# Patient Record
Sex: FEMALE | Race: BLACK OR AFRICAN AMERICAN | NOT HISPANIC OR LATINO | Employment: OTHER | URBAN - METROPOLITAN AREA
[De-identification: names, ages, dates, MRNs, and addresses within clinical notes are randomized per-mention and may not be internally consistent; named-entity substitution may affect disease eponyms.]

---

## 2017-02-13 ENCOUNTER — GENERIC CONVERSION - ENCOUNTER (OUTPATIENT)
Dept: OTHER | Facility: OTHER | Age: 37
End: 2017-02-13

## 2017-03-13 ENCOUNTER — HOSPITAL ENCOUNTER (EMERGENCY)
Facility: HOSPITAL | Age: 37
Discharge: HOME | End: 2017-03-13
Attending: EMERGENCY MEDICINE | Admitting: EMERGENCY MEDICINE
Payer: COMMERCIAL

## 2017-03-13 VITALS
OXYGEN SATURATION: 100 % | HEART RATE: 99 BPM | RESPIRATION RATE: 18 BRPM | SYSTOLIC BLOOD PRESSURE: 132 MMHG | DIASTOLIC BLOOD PRESSURE: 70 MMHG | TEMPERATURE: 98.6 F

## 2017-03-13 DIAGNOSIS — K08.89 TOOTHACHE: Primary | ICD-10-CM

## 2017-03-13 PROCEDURE — 99282 EMERGENCY DEPT VISIT SF MDM: CPT

## 2017-03-13 RX ORDER — HYDROCODONE BITARTRATE AND ACETAMINOPHEN 5; 325 MG/1; MG/1
1 TABLET ORAL EVERY 6 HOURS PRN
Qty: 6 TABLET | Refills: 0 | Status: SHIPPED | OUTPATIENT
Start: 2017-03-13 | End: 2017-03-17

## 2017-03-13 RX ORDER — PENICILLIN V POTASSIUM 500 MG/1
500 TABLET ORAL 4 TIMES DAILY
Qty: 40 TABLET | Refills: 0 | Status: SHIPPED | OUTPATIENT
Start: 2017-03-13 | End: 2017-03-23

## 2017-10-27 ENCOUNTER — HOSPITAL ENCOUNTER (OUTPATIENT)
Dept: RADIOLOGY | Age: 37
Discharge: HOME | End: 2017-10-27
Payer: COMMERCIAL

## 2017-10-27 DIAGNOSIS — C73 MALIGNANT NEOPLASM OF THYROID GLAND (HCC): ICD-10-CM

## 2018-03-07 ENCOUNTER — HOSPITAL ENCOUNTER (OUTPATIENT)
Dept: RADIOLOGY | Age: 38
Discharge: HOME | End: 2018-03-07
Payer: COMMERCIAL

## 2018-03-07 PROCEDURE — 96372 THER/PROPH/DIAG INJ SC/IM: CPT

## 2018-03-07 RX ADMIN — THYROTROPIN ALFA 0.9 MG: 0.9 INJECTION, POWDER, FOR SOLUTION INTRAMUSCULAR at 09:15

## 2018-03-08 ENCOUNTER — HOSPITAL ENCOUNTER (OUTPATIENT)
Dept: RADIOLOGY | Age: 38
Discharge: HOME | End: 2018-03-08
Payer: COMMERCIAL

## 2018-03-08 DIAGNOSIS — C73 MALIGNANT NEOPLASM OF THYROID GLAND (HCC): ICD-10-CM

## 2018-03-08 PROCEDURE — 96372 THER/PROPH/DIAG INJ SC/IM: CPT

## 2018-03-08 RX ADMIN — THYROTROPIN ALFA 0.9 MG: 0.9 INJECTION, POWDER, FOR SOLUTION INTRAMUSCULAR at 09:05

## 2018-03-09 ENCOUNTER — TRANSCRIBE ORDERS (OUTPATIENT)
Dept: ADMINISTRATIVE | Age: 38
End: 2018-03-09

## 2018-03-09 ENCOUNTER — HOSPITAL ENCOUNTER (OUTPATIENT)
Dept: RADIOLOGY | Age: 38
Discharge: HOME | End: 2018-03-09
Payer: COMMERCIAL

## 2018-03-09 ENCOUNTER — HOSPITAL ENCOUNTER (OUTPATIENT)
Dept: RADIOLOGY | Facility: HOSPITAL | Age: 38
Discharge: HOME | End: 2018-03-09
Payer: COMMERCIAL

## 2018-03-09 ENCOUNTER — APPOINTMENT (OUTPATIENT)
Dept: LAB | Age: 38
End: 2018-03-09
Payer: COMMERCIAL

## 2018-03-09 DIAGNOSIS — C73 MALIGNANT NEOPLASM OF THYROID GLAND (HCC): Primary | ICD-10-CM

## 2018-03-09 DIAGNOSIS — C73 MALIGNANT NEOPLASM OF THYROID GLAND (HCC): ICD-10-CM

## 2018-03-09 LAB — TSH SERPL DL<=0.05 MIU/L-ACNC: 74.2 UIU/ML (ref 0.36–3.74)

## 2018-03-09 PROCEDURE — A9509 IODINE I-123 SOD IODIDE MIL: HCPCS

## 2018-03-09 PROCEDURE — 36415 COLL VENOUS BLD VENIPUNCTURE: CPT

## 2018-03-09 PROCEDURE — 79005 NUCLEAR RX ORAL ADMIN: CPT

## 2018-03-09 PROCEDURE — 84432 ASSAY OF THYROGLOBULIN: CPT

## 2018-03-09 PROCEDURE — A9517 I131 IODIDE CAP, RX: HCPCS

## 2018-03-09 PROCEDURE — 84443 ASSAY THYROID STIM HORMONE: CPT

## 2018-03-09 PROCEDURE — 86800 THYROGLOBULIN ANTIBODY: CPT

## 2018-03-09 PROCEDURE — 78018 THYROID MET IMAGING BODY: CPT

## 2018-03-10 LAB — THYROGLOB AB SERPL-ACNC: 26.4 IU/ML (ref 0–0.9)

## 2018-03-14 LAB
THYROGLOB AB SERPL-ACNC: 24.3 IU/ML (ref 0–0.9)
THYROGLOB SERPL-MCNC: 2.5 NG/ML

## 2018-03-15 ENCOUNTER — HOSPITAL ENCOUNTER (OUTPATIENT)
Dept: RADIOLOGY | Age: 38
Discharge: HOME | End: 2018-03-15
Payer: COMMERCIAL

## 2018-03-15 DIAGNOSIS — C73 MALIGNANT NEOPLASM OF THYROID GLAND (HCC): ICD-10-CM

## 2018-03-15 PROCEDURE — 78018 THYROID MET IMAGING BODY: CPT

## 2018-03-16 ENCOUNTER — TRANSCRIBE ORDERS (OUTPATIENT)
Dept: ADMINISTRATIVE | Facility: HOSPITAL | Age: 38
End: 2018-03-16

## 2018-03-16 DIAGNOSIS — C73 MALIGNANT NEOPLASM OF THYROID GLAND (HCC): Primary | ICD-10-CM

## 2018-03-22 ENCOUNTER — HOSPITAL ENCOUNTER (OUTPATIENT)
Dept: ULTRASOUND IMAGING | Facility: HOSPITAL | Age: 38
Discharge: HOME | End: 2018-03-22
Payer: COMMERCIAL

## 2018-03-22 DIAGNOSIS — C73 MALIGNANT NEOPLASM OF THYROID GLAND (HCC): ICD-10-CM

## 2018-03-22 PROCEDURE — 76536 US EXAM OF HEAD AND NECK: CPT

## 2018-04-19 ENCOUNTER — HOSPITAL ENCOUNTER (EMERGENCY)
Facility: HOSPITAL | Age: 38
Discharge: HOME | End: 2018-04-19
Attending: EMERGENCY MEDICINE | Admitting: EMERGENCY MEDICINE
Payer: COMMERCIAL

## 2018-04-19 VITALS
WEIGHT: 163 LBS | HEIGHT: 63 IN | TEMPERATURE: 98.5 F | DIASTOLIC BLOOD PRESSURE: 97 MMHG | HEART RATE: 106 BPM | BODY MASS INDEX: 28.88 KG/M2 | RESPIRATION RATE: 18 BRPM | OXYGEN SATURATION: 99 % | SYSTOLIC BLOOD PRESSURE: 160 MMHG

## 2018-04-19 DIAGNOSIS — R51.9 HEADACHE: Primary | ICD-10-CM

## 2018-04-19 DIAGNOSIS — R53.81 MALAISE: ICD-10-CM

## 2018-04-19 DIAGNOSIS — D64.9 ANEMIA: ICD-10-CM

## 2018-04-19 LAB
ANION GAP SERPL CALCULATED.3IONS-SCNC: 10 MMOL/L (ref 4–13)
BASOPHILS # BLD AUTO: 0.06 THOUSANDS/ΜL (ref 0–0.1)
BASOPHILS NFR BLD AUTO: 1 % (ref 0–1)
BUN SERPL-MCNC: 10 MG/DL (ref 5–25)
CALCIUM SERPL-MCNC: 9.2 MG/DL (ref 8.3–10.1)
CHLORIDE SERPL-SCNC: 104 MMOL/L (ref 100–108)
CO2 SERPL-SCNC: 27 MMOL/L (ref 21–32)
CREAT SERPL-MCNC: 0.68 MG/DL (ref 0.6–1.3)
EOSINOPHIL # BLD AUTO: 0.06 THOUSAND/ΜL (ref 0–0.61)
EOSINOPHIL NFR BLD AUTO: 1 % (ref 0–6)
ERYTHROCYTE [DISTWIDTH] IN BLOOD BY AUTOMATED COUNT: 23.2 % (ref 11.6–15.1)
GFR SERPL CREATININE-BSD FRML MDRD: 130 ML/MIN/1.73SQ M
GLUCOSE SERPL-MCNC: 112 MG/DL (ref 65–140)
HCT VFR BLD AUTO: 32.4 % (ref 34.8–46.1)
HGB BLD-MCNC: 9.8 G/DL (ref 11.5–15.4)
LYMPHOCYTES # BLD AUTO: 1.64 THOUSANDS/ΜL (ref 0.6–4.47)
LYMPHOCYTES NFR BLD AUTO: 33 % (ref 14–44)
MCH RBC QN AUTO: 19.3 PG (ref 26.8–34.3)
MCHC RBC AUTO-ENTMCNC: 30.2 G/DL (ref 31.4–37.4)
MCV RBC AUTO: 64 FL (ref 82–98)
MONOCYTES # BLD AUTO: 0.26 THOUSAND/ΜL (ref 0.17–1.22)
MONOCYTES NFR BLD AUTO: 5 % (ref 4–12)
NEUTROPHILS # BLD AUTO: 3.01 THOUSANDS/ΜL (ref 1.85–7.62)
NEUTS SEG NFR BLD AUTO: 60 % (ref 43–75)
PLATELET # BLD AUTO: 623 THOUSANDS/UL (ref 149–390)
PMV BLD AUTO: 9.6 FL (ref 8.9–12.7)
POTASSIUM SERPL-SCNC: 3.4 MMOL/L (ref 3.5–5.3)
RBC # BLD AUTO: 5.08 MILLION/UL (ref 3.81–5.12)
SODIUM SERPL-SCNC: 141 MMOL/L (ref 136–145)
WBC # BLD AUTO: 5.03 THOUSAND/UL (ref 4.31–10.16)

## 2018-04-19 PROCEDURE — 99283 EMERGENCY DEPT VISIT LOW MDM: CPT

## 2018-04-19 PROCEDURE — 36415 COLL VENOUS BLD VENIPUNCTURE: CPT | Performed by: EMERGENCY MEDICINE

## 2018-04-19 PROCEDURE — 85025 COMPLETE CBC W/AUTO DIFF WBC: CPT | Performed by: EMERGENCY MEDICINE

## 2018-04-19 PROCEDURE — 80048 BASIC METABOLIC PNL TOTAL CA: CPT | Performed by: EMERGENCY MEDICINE

## 2018-04-19 RX ORDER — FERROUS SULFATE 325(65) MG
325 TABLET ORAL DAILY
Qty: 30 TABLET | Refills: 0 | Status: SHIPPED | OUTPATIENT
Start: 2018-04-19 | End: 2021-06-11

## 2018-04-19 RX ORDER — IBUPROFEN 600 MG/1
600 TABLET ORAL ONCE
Status: DISCONTINUED | OUTPATIENT
Start: 2018-04-19 | End: 2018-04-19 | Stop reason: HOSPADM

## 2018-04-19 RX ORDER — PSEUDOEPHEDRINE HYDROCHLORIDE 30 MG/1
30 TABLET ORAL ONCE
Status: DISCONTINUED | OUTPATIENT
Start: 2018-04-19 | End: 2018-04-19 | Stop reason: HOSPADM

## 2018-04-19 RX ORDER — LEVOTHYROXINE SODIUM 0.15 MG/1
300 TABLET ORAL DAILY
Refills: 0 | COMMUNITY
Start: 2018-02-22 | End: 2018-08-14 | Stop reason: DRUGHIGH

## 2018-04-19 RX ORDER — LEVOTHYROXINE SODIUM 0.1 MG/1
100 TABLET ORAL
COMMUNITY
Start: 2016-09-29 | End: 2018-08-14 | Stop reason: DRUGHIGH

## 2018-04-19 NOTE — ED NOTES
Encouraged patient to give urine sample-unable at this time  "i can't give you urine"       LA NENA Fontaine  04/19/18 0393

## 2018-04-19 NOTE — ED PROVIDER NOTES
History  Chief Complaint   Patient presents with    Headache     stated "Neorologist stated that her iron is low, and has a extermly bad headach" No blurred vision, or N/V/D  Pain noted in the middle of head, no trauma or falls  Did not take anything for the pain  71-year-old female presents to the emergency department relating I have MS    She relates having had MRIs in 2 years    She relates I feel totally different    She relates I just had blood work   She relates that her iron was low  Blood work was performed through her neurology office and she has an appointment in May to follow up with her primary care physician regarding this  She relates that for the past 2 weeks she has been feeling overall different    The she relates that over this time she will have white and black spots in her vision that last a couple of minutes  She estimates that this has been occurring 15 minutes a day  She gestures around the eyes and over the forehead and states this hurts    She additionally gestures to the right upper back and right lower back explaining that she has been having pain in those areas with the amount of time  She relates that she has had urinary frequency over this time without dysuria or hematuria  Feels overall weak though has not had any focal weakness  No blurred vision or diplopia  No chest discomfort or shortness of breath  No abdominal pain  Medical history significant for the multiple sclerosis  Patient relates that she recently changed neurologists and that had her most recent appointment a little over 2 weeks ago she was advised that annual MRIs were not necessary as she states her prior neurologist had told her were appropriate  Patient relates that her neurology office was contacted today regarding her new symptoms and that she is awaiting a call back from them  Medical history additionally significant for thyroid cancer    Patient had thyroidectomy in 2016 and completed radiation therapy 2 months ago  She has been on unchanged dose of levothyroxine  Patient does report heavy menstrual cycles  Currently menstruating  Prior to Admission Medications   Prescriptions Last Dose Informant Patient Reported? Taking?   levothyroxine 100 mcg tablet 2018 at Unknown time  Yes Yes   Sig: Take 100 mcg by mouth   levothyroxine 150 mcg tablet 2018 at Unknown time  Yes Yes   Si mcg daily      Facility-Administered Medications: None       Past Medical History:   Diagnosis Date    Multiple sclerosis ([facility] )     Thyroid cancer ([facility] )        Past Surgical History:   Procedure Laterality Date    THYROIDECTOMY         History reviewed  No pertinent family history  I have reviewed and agree with the history as documented  Social History   Substance Use Topics    Smoking status: Former Smoker    Smokeless tobacco: Never Used    Alcohol use No        Review of Systems   All other systems reviewed and are negative  Physical Exam  ED Triage Vitals [18 1030]   Temperature Pulse Respirations Blood Pressure SpO2   98 5 °F (36 9 °C) (!) 106 18 160/97 99 %      Temp Source Heart Rate Source Patient Position - Orthostatic VS BP Location FiO2 (%)   Oral Monitor Sitting Right arm --      Pain Score       Worst Possible Pain           Orthostatic Vital Signs  Vitals:    18 1030   BP: 160/97   Pulse: (!) 106   Patient Position - Orthostatic VS: Sitting       Physical Exam   Constitutional: She is oriented to person, place, and time  She appears well-developed and well-nourished  HENT:   Nose: Mucosal edema and rhinorrhea present  Right sinus exhibits maxillary sinus tenderness and frontal sinus tenderness  Left sinus exhibits frontal sinus tenderness  Left sinus exhibits no maxillary sinus tenderness  Mouth/Throat: Oropharynx is clear and moist    Eyes: Conjunctivae and EOM are normal  Pupils are equal, round, and reactive to light     No APD   Cardiovascular: Normal rate and regular rhythm  Pulmonary/Chest: Effort normal and breath sounds normal    Abdominal: Soft  Bowel sounds are normal  There is no tenderness  Musculoskeletal: Normal range of motion  Neurological: She is alert and oriented to person, place, and time  Clear speech  No facial asymmetry/ deficit appreciated  Pupils briskly reactive to light  EOMI  No nystagmus  Strong eye closure & symmetric brow raise  Ability to insufflate cheeks, protrude tongue midline & range this laterally  Symmetric/ intact sensation in the upper, mid & lower portions of the face  5/5 strength on head turn, shoulder shrug, bicep, tricep & deltoid movement against resistance b/l   5/5 strength on b/l hand , pincer grasp, finger abduction, dorsi & volar flexion, hip flexion, dorsi & plantar flexion  Symmetric grossly intact sensation in the UE & LE  Steady gait  Able to heel, toe and tandem walk without difficulty  No dysmetria  Skin: Skin is warm and dry  Psychiatric: She has a normal mood and affect  Her behavior is normal    Nursing note and vitals reviewed  ED Medications  Medications - No data to display    Diagnostic Studies  Results Reviewed     Procedure Component Value Units Date/Time    Basic metabolic panel [[id number]]  (Abnormal) Collected:  04/19/18 1150    Lab Status:  Final result Specimen:  Blood from Arm, Right Updated:  04/19/18 1218     Sodium 141 mmol/L      Potassium 3 4 (L) mmol/L      Chloride 104 mmol/L      CO2 27 mmol/L      Anion Gap 10 mmol/L      BUN 10 mg/dL      Creatinine 0 68 mg/dL      Glucose 112 mg/dL      Calcium 9 2 mg/dL      eGFR 130 ml/min/1 73sq m     Narrative:         National Kidney Disease Education Program recommendations are as follows:  GFR calculation is accurate only with a steady state creatinine  Chronic Kidney disease less than 60 ml/min/1 73 sq  meters  Kidney failure less than 15 ml/min/1 73 sq  meters      CBC and differential [[id number]]  (Abnormal) Collected:  04/19/18 1150    Lab Status:  Final result Specimen:  Blood from Arm, Right Updated:  04/19/18 1158     WBC 5 03 Thousand/uL      RBC 5 08 Million/uL      Hemoglobin 9 8 (L) g/dL      Hematocrit 32 4 (L) %      MCV 64 (L) fL      MCH 19 3 (L) pg      MCHC 30 2 (L) g/dL      RDW 23 2 (H) %      MPV 9 6 fL      Platelets 428 (H) Thousands/uL      Neutrophils Relative 60 %      Lymphocytes Relative 33 %      Monocytes Relative 5 %      Eosinophils Relative 1 %      Basophils Relative 1 %      Neutrophils Absolute 3 01 Thousands/µL      Lymphocytes Absolute 1 64 Thousands/µL      Monocytes Absolute 0 26 Thousand/µL      Eosinophils Absolute 0 06 Thousand/µL      Basophils Absolute 0 06 Thousands/µL                  No orders to display              Procedures  Procedures       Phone Contacts  ED Phone Contact    ED Course       ED Course as of Apr 19 1737   Thu Apr 19, 2018   1314 Pt  Wishes to leave at this time  She relates that she is feeling the same as when she came in  Medications not administered as patient has not yet provided urine specimen  The patient relates that she is certain that she is not pregnant as she did have a tubal ligation last summer and additionally has her  I indicated that we could provide the medication to her with her understanding that this could cause a complication in a pregnancy  Patient declined medications and then stated that pills never help her  She has not been able to provide a urine specimen thus far and relates that she will follow up with her gynecologist for this  I explained that we were looking to assess for possible UTI which could factor into her symptoms  She relates that she has had UTIs previously and has had symptoms beyond just urinary frequency  She does not believe she has an infection at this time  She was curious as to her blood work results  She is mildly anemic  Interested in iron supplement  Will F/U w/ PCP    Will additionally follow up with neurologist regarding headaches /vision changes  Well-appearing at discharge  MDM  CritCare Time    Disposition  Final diagnoses:   Headache   Malaise   Anemia     Time reflects when diagnosis was documented in both MDM as applicable and the Disposition within this note     Time User Action Codes Description Comment    4/19/2018  1:15 PM Stevan Marietta A Add [R51] Headache     4/19/2018  1:15 PM Stevan Marietta A Add [R53 81] Malaise     4/19/2018  1:15 PM Stevan Marietta A Add [D64 9] Anemia       ED Disposition     ED Disposition Condition Comment    Discharge  Jingchasity Jr discharge to home/self care  Condition at discharge: Good        Follow-up Information     Follow up With Specialties Details Why Juan Dickerson MD Family Medicine Go to your appointment as scheduled  Adonis Weems      your Neurologist  Schedule an appointment as soon as possible for a visit          Discharge Medication List as of 4/19/2018  1:17 PM      START taking these medications    Details   ferrous sulfate 325 (65 Fe) mg tablet Take 1 tablet (325 mg total) by mouth daily, Starting Thu 4/19/2018, Print         CONTINUE these medications which have NOT CHANGED    Details   !! levothyroxine 100 mcg tablet Take 100 mcg by mouth, Starting Thu 9/29/2016, Historical Med      !! levothyroxine 150 mcg tablet 300 mcg daily, Starting Thu 2/22/2018, Historical Med       !! - Potential duplicate medications found  Please discuss with provider  No discharge procedures on file      ED Provider  Electronically Signed by           Rah Prakash MD  04/19/18 0168

## 2018-04-19 NOTE — ED NOTES
Patient reports "i have to leave right now to get my son"  Patient states "I will wait until 1:15 and then I am going  What is happening?" Informed patient we are awaiting urine sample prior to administering medications prescribed for pregnancy test   Patient refusing/says "i can't give urine right now"-aware of patient plan        Jackson Gamble RN  04/19/18 1417

## 2018-04-19 NOTE — DISCHARGE INSTRUCTIONS
Acute Headache   WHAT YOU NEED TO KNOW:   An acute headache is pain or discomfort that starts suddenly and gets worse quickly  You may have an acute headache only when you feel stress or eat certain foods  Other acute headache pain can happen every day, and sometimes several times a day  DISCHARGE INSTRUCTIONS:   Seek care immediately if:   · You have severe pain  · You have numbness or weakness on one side of your face or body  · You have a headache that occurs after a blow to the head, a fall, or other trauma  · You have a headache, are forgetful or confused, or have trouble speaking  · You have a headache, stiff neck, and a fever  Contact your healthcare provider if:   · You have a constant headache and are vomiting  · You have a headache each day that does not get better, even after treatment  · You have changes in your headaches, or new symptoms that occur when you have a headache  · You have questions or concerns about your condition or care  Medicines: You may need any of the following:  · Prescription pain medicine  may be given  The medicine your healthcare provider recommends will depend on the kind of headaches you have  You will need to take prescription headache medicines as directed to prevent a problem called rebound headache  These headaches happen with regular use of pain relievers for headache disorders  · NSAIDs , such as ibuprofen, help decrease swelling, pain, and fever  This medicine is available with or without a doctor's order  NSAIDs can cause stomach bleeding or kidney problems in certain people  If you take blood thinner medicine, always ask your healthcare provider if NSAIDs are safe for you  Always read the medicine label and follow directions  · Acetaminophen  decreases pain and fever  It is available without a doctor's order  Ask how much to take and how often to take it  Follow directions   Read the labels of all other medicines you are using to see if they also contain acetaminophen, or ask your doctor or pharmacist  Acetaminophen can cause liver damage if not taken correctly  Do not use more than 3 grams (3,000 milligrams) total of acetaminophen in one day  · Antidepressants  may be given for some kinds of headaches  · Take your medicine as directed  Contact your healthcare provider if you think your medicine is not helping or if you have side effects  Tell him or her if you are allergic to any medicine  Keep a list of the medicines, vitamins, and herbs you take  Include the amounts, and when and why you take them  Bring the list or the pill bottles to follow-up visits  Carry your medicine list with you in case of an emergency  Manage your symptoms:   · Apply heat or ice  on the headache area  Use a heat or ice pack  For an ice pack, you can also put crushed ice in a plastic bag  Cover the pack or bag with a towel before you apply it to your skin  Ice and heat both help decrease pain, and heat also helps decrease muscle spasms  Apply heat for 20 to 30 minutes every 2 hours  Apply ice for 15 to 20 minutes every hour  Apply heat or ice for as long and for as many days as directed  You may alternate heat and ice  · Relax your muscles  Lie down in a comfortable position and close your eyes  Relax your muscles slowly  Start at your toes and work your way up your body  · Keep a record of your headaches  Write down when your headaches start and stop  Include your symptoms and what you were doing when the headache began  Record what you ate or drank for 24 hours before the headache started  Describe the pain and where it hurts  Keep track of what you did to treat your headache and if it worked  Prevent an acute headache:   · Avoid anything that triggers an acute headache  Examples include exposure to chemicals, going to high altitude, or not getting enough sleep  Create a regular sleep routine   Go to sleep at the same time and wake up at the same time each day  Do not use electronic devices before bedtime  These may trigger a headache or prevent you from sleeping well  · Do not smoke  Nicotine and other chemicals in cigarettes and cigars can trigger an acute headache or make it worse  Ask your healthcare provider for information if you currently smoke and need help to quit  E-cigarettes or smokeless tobacco still contain nicotine  Talk to your healthcare provider before you use these products  · Limit alcohol as directed  Alcohol can trigger an acute headache or make it worse  If you have cluster headaches, do not drink alcohol during an episode  For other types of headaches, ask your healthcare provider if it is safe for you to drink alcohol  Ask how much is safe for you to drink, and how often  · Exercise as directed  Exercise can reduce tension and help with headache pain  Aim for 30 minutes of physical activity on most days of the week  Your healthcare provider can help you create an exercise plan  · Eat a variety of healthy foods  Healthy foods include fruits, vegetables, low-fat dairy products, lean meats, fish, whole grains, and cooked beans  Your healthcare provider or dietitian can help you create meals plans if you need to avoid foods that trigger headaches  Follow up with your healthcare provider as directed:  Bring your headache record with you when you see your healthcare provider  Write down your questions so you remember to ask them during your visits  © 2017 [employer] Information is for End User's use only and may not be sold, redistributed or otherwise used for commercial purposes  All illustrations and images included in CareNotes® are the copyrighted property of [employer]  or [employer]  The above information is an  only  It is not intended as medical advice for individual conditions or treatments   Talk to your doctor, nurse or pharmacist before following any medical regimen to see if it is safe and effective for you  Anemia   WHAT YOU NEED TO KNOW:   Anemia is a low number of red blood cells or a low amount of hemoglobin in your red blood cells  Hemoglobin is a protein that helps carry oxygen throughout your body  Red blood cells use iron to create hemoglobin  Anemia may develop if your body does not have enough iron  It may also develop if your body does not make enough red blood cells or they die faster than your body can make them  DISCHARGE INSTRUCTIONS:   Call 911 or have someone call 911 for any of the following:   · You lose consciousness  · You have severe chest pain  Seek care immediately if:   · You have dark or bloody bowel movements  Contact your healthcare provider if:   · Your symptoms are worse, even after treatment  · You have questions or concerns about your condition or care  Medicines:   · Iron or folic acid supplements  help increase your red blood cell and hemoglobin levels  · Vitamin B12 injections  may help boost your red blood cell level and decrease your symptoms  Ask your healthcare provider how to inject B12  · Take your medicine as directed  Contact your healthcare provider if you think your medicine is not helping or if you have side effects  Tell him of her if you are allergic to any medicine  Keep a list of the medicines, vitamins, and herbs you take  Include the amounts, and when and why you take them  Bring the list or the pill bottles to follow-up visits  Carry your medicine list with you in case of an emergency  Prevent anemia:  Eat healthy foods rich in iron and vitamin C  Nuts, meat, dark leafy green vegetables, and beans are high in iron and protein  Vitamin C helps your body absorb iron  Foods rich in vitamin C include oranges and other citrus fruits  Ask your healthcare provider for a list of other foods that are high in iron or vitamin C  Ask if you need to be on a special diet     Follow up with your healthcare provider as directed:  Write down your questions so you remember to ask them during your visits  © 2017 [employer] Information is for End User's use only and may not be sold, redistributed or otherwise used for commercial purposes  All illustrations and images included in CareNotes® are the copyrighted property of [employer]  or [employer]  The above information is an  only  It is not intended as medical advice for individual conditions or treatments  Talk to your doctor, nurse or pharmacist before following any medical regimen to see if it is safe and effective for you

## 2018-04-19 NOTE — ED NOTES
Continue to encourage patient to give urine sample "i am not going to go to the bathroom right now!"   Patient aware of medications ordered by provider     [id number] [facility] Chris, RN  04/19/18 4802

## 2018-08-13 ENCOUNTER — TRANSCRIBE ORDERS (OUTPATIENT)
Dept: NEUROLOGY | Facility: CLINIC | Age: 38
End: 2018-08-13

## 2018-08-13 DIAGNOSIS — G35 MULTIPLE SCLEROSIS (HCC): Primary | ICD-10-CM

## 2018-08-14 ENCOUNTER — OFFICE VISIT (OUTPATIENT)
Dept: NEUROLOGY | Facility: CLINIC | Age: 38
End: 2018-08-14
Payer: COMMERCIAL

## 2018-08-14 VITALS
SYSTOLIC BLOOD PRESSURE: 133 MMHG | WEIGHT: 173.2 LBS | RESPIRATION RATE: 16 BRPM | BODY MASS INDEX: 30.68 KG/M2 | DIASTOLIC BLOOD PRESSURE: 89 MMHG | HEART RATE: 101 BPM

## 2018-08-14 DIAGNOSIS — G35 MULTIPLE SCLEROSIS (HCC): ICD-10-CM

## 2018-08-14 PROCEDURE — 99215 OFFICE O/P EST HI 40 MIN: CPT | Performed by: PSYCHIATRY & NEUROLOGY

## 2018-08-14 RX ORDER — AMITRIPTYLINE HYDROCHLORIDE 25 MG/1
1 TABLET, FILM COATED ORAL
COMMUNITY
Start: 2015-12-21 | End: 2021-10-06

## 2018-08-14 RX ORDER — OMEGA-3S/DHA/EPA/FISH OIL/D3 300MG-1000
CAPSULE ORAL
Refills: 12 | COMMUNITY
Start: 2018-08-13

## 2018-08-14 RX ORDER — VENLAFAXINE HYDROCHLORIDE 150 MG/1
150 CAPSULE, EXTENDED RELEASE ORAL EVERY MORNING
Refills: 0 | COMMUNITY
Start: 2018-06-22 | End: 2021-10-06

## 2018-08-14 RX ORDER — TRAZODONE HYDROCHLORIDE 100 MG/1
100 TABLET ORAL
Refills: 1 | COMMUNITY
Start: 2018-07-16 | End: 2021-10-06

## 2018-08-14 RX ORDER — METHIMAZOLE 10 MG/1
3 TABLET ORAL DAILY
COMMUNITY
End: 2021-10-06

## 2018-08-14 RX ORDER — LAMOTRIGINE 100 MG/1
TABLET ORAL
Refills: 1 | COMMUNITY
Start: 2018-07-16 | End: 2021-10-06

## 2018-08-14 RX ORDER — BUTALBITAL, ACETAMINOPHEN AND CAFFEINE 300; 40; 50 MG/1; MG/1; MG/1
CAPSULE ORAL
COMMUNITY
Start: 2015-12-21 | End: 2019-02-06

## 2018-08-14 RX ORDER — ALPRAZOLAM 1 MG/1
TABLET ORAL
Refills: 1 | COMMUNITY
Start: 2018-07-16 | End: 2021-10-06

## 2018-08-14 RX ORDER — LEVOTHYROXINE SODIUM 300 UG/1
300 TABLET ORAL DAILY
Refills: 0 | COMMUNITY
Start: 2018-08-10 | End: 2019-02-06

## 2018-08-14 NOTE — PROGRESS NOTES
Patient ID: Roula Roldan is a 41 y o  female  Assessment/Plan:    Multiple sclerosis (HCC)  Pt here for neuro follow up  Pt last seen in dec 2015  [facility]  Pt had been on copaxone tiw just prior to conceiving  Pt off med for 2 yrs  Pt with one yr old son here in office today  Pt has been off imd meds since delivery  Pt did have tubal ligation done after last child  Pt with recent admission to [facility] for abn mri   Only have 2 reports presently ie mri lumbar and mri t  Pt with old chronic lesion at T10  Pt wihtout lesion at conus, she does have ddd at 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 [address] )  -     Ambulatory referral to Neurology  -     MRI brain MS wo and w contrast; Future  -     CBC and differential; Future  -     Hepatic function panel; Future  -     BUN; Future  -     Creatinine, serum; Future  -     Lyme Antibody Profile with reflex to WB; Future  -     Sjogren's Antibodies; Future    Other orders  -     ALPRAZolam (XANAX) 1 mg tablet; TAKE ONE TABLET BY MOUTH 4 TIMES A DAY AS NEEDED  -     amitriptyline (ELAVIL) 25 mg tablet; Take 1 tablet by mouth  -     cholecalciferol (VITAMIN D3) 400 units tablet; TAKE 1 TABLET(S) EVERY DAY BY ORAL ROUTE WITH MEALS FOR 30 DAYS  -     Butalbital-APAP-Caffeine (FIORICET) -40 MG CAPS;  Take by mouth  -     lamoTRIgine (LaMICtal) 100 mg tablet; TAKE 2 TABLETS BY MOUTH EVERY MORNING AND TAKE ONE TABLET DAILY AT 5PM  -     levothyroxine 175 mcg tablet; 350 mcg daily  -     methimazole (TAPAZOLE) 10 mg tablet; Take 3 tablets by mouth daily  -     traZODone (DESYREL) 100 mg tablet; Take 100 mg by mouth daily at bedtime as needed  -     venlafaxine (EFFEXOR-XR) 75 mg 24 hr capsule; Take 75 mg by mouth every morning  -     Dimethyl Fumarate (TECFIDERA) 120 & 240 MG MISC; Take by mouth           Subjective:    HPI    Pt here for neuro follow up  Pt last seen in dec 2015  Pt here today with her one yr old son  Pt was following with [place] [facility]  Pt had been on copaxone tiw just prior to conceiving  Pt off med for 2 yrs since pregancy and now son one yr old for total of 2 yrs  Pt has been off imd meds even after completion of breast feeding  Pt had breast fed for about 6mos post delivery  Pt did have tubal ligation done after last child  Pt with recent admission to [facility] for abn mri   Only have 2 reports presently ie mri lumbar and mri t spine sent over at time of appt  Per pt she had 3 studies done c, t and l spine  Pt with old chronic lesion at T10  Pt wihtout lesion at conus, she does have ddd at L5/s1 with mod foraminal narrowing at this level  No enh on either of these studies  Per pt new lesion in c spine, but I still need report  In interim also need mri head updated  Pt to have updated jcv as well to help with future med decisions  Rev all avail imd meds  Rev all efficacy and side effect profiles  Extended appt due to long time since last visit as well as recent hospitalization review as well as imd availability and tailoring to pt sxs  Pt is most comfortable with copaxone tiw  Pt does have rx for tecfidera [facility] but does not want this med at this time  Pt adamant about no med with pml risk  Will submit rx for copaxone  Signed in office today  Pt to have updated jcv status to help with decisions of other med availability   Pt had thryoidectomy in 2016 due to thyroid cancer  Pt is following up with her thyroid team, just seen less than one week ago  Staff called twice during appt to get imaging results for me  Still awaiting report on mri c spine  Total time spent today 50  minutes  Greater than 50% of total time was spent with the patient and / or family counseling and / or coordination of care        The following portions of the patient's history were reviewed and updated as appropriate: allergies, current medications, past family history, past medical history, past social history, past surgical history and problem list          Objective:    Blood pressure 133/89, pulse 101, resp  rate 16, weight 78 6 kg (173 lb 3 2 oz), unknown if currently breastfeeding  Physical Exam   Constitutional: She appears well-developed and well-nourished  Eyes: EOM are normal  Pupils are equal, round, and reactive to light  Cardiovascular: Intact distal pulses  Neurological: She has normal strength and normal reflexes  Gait normal    Psychiatric: Her speech is normal        Neurological Exam    Mental Status  The patient is alert and oriented to person, place, time, and situation  Her recent and remote memory are normal  Her speech is normal  Her language is fluent with no aphasia  She has normal attention span and concentration  She follows multi-step commands  She has a normal fund of knowledge  Cranial Nerves    CN II: The patient's visual acuity and visual fields are normal   CN III, IV, VI: The patient's pupils are equally round and reactive to light and ocular movements are normal   CN V: The patient has normal facial sensation  CN VII:  The patient has symmetric facial movement  CN VIII:  The patient's hearing is normal   CN IX, X: The patient has symmetric palate movement and normal gag reflex    CN XI: The patient's shoulder shrug strength is normal   CN XII: The patient's tongue is midline without atrophy or fasciculations  Motor  The patient has normal muscle bulk throughout  Her overall muscle tone is normal throughout  Her strength is 5/5 throughout all four extremities  Sensory  The patient's sensation is normal in all four extremities  Reflexes  Deep tendon reflexes are 2+ and symmetric in all four extremities with downgoing toes bilaterally  Gait and Coordination  The patient has normal gait and station  She has normal right finger to nose and normal left finger to nose coordination  Neg babinski estuardo  No pronator drift or fix estuardo         ROS:    Review of Systems   Constitutional: Negative  Negative for appetite change and fever  HENT: Negative  Negative for hearing loss, tinnitus, trouble swallowing and voice change  Eyes: Negative  Negative for photophobia and pain  Respiratory: Negative  Negative for shortness of breath  Cardiovascular: Negative  Negative for palpitations  Gastrointestinal: Negative  Negative for nausea and vomiting  Endocrine: Negative  Negative for cold intolerance and heat intolerance  Genitourinary: Negative  Negative for dysuria, frequency and urgency  Musculoskeletal: Positive for back pain  Negative for myalgias and neck pain  Skin: Negative  Negative for rash  Neurological: Positive for numbness (tingling )  Negative for dizziness, tremors, seizures, syncope, facial asymmetry, speech difficulty, weakness, light-headedness and headaches  Hematological: Negative  Does not bruise/bleed easily  Psychiatric/Behavioral: Negative  Negative for confusion, hallucinations and sleep disturbance

## 2018-08-14 NOTE — PATIENT INSTRUCTIONS
Multiple sclerosis ([facility] [address] )  Pt here for neuro follow up  Pt last seen in dec 2015  [facility]  Pt had been on copaxone tiw just prior to conceiving  Pt off med for 2 yrs  Pt with one yr old son here in office today  Pt has been off imd meds since delivery  Pt did have tubal ligation done after last child  Pt with recent admission to [facility] for abn mri   Only have 2 reports presently ie mri lumbar and mri t  Pt with old chronic lesion at T10  Pt wihtout lesion at conus, she does have ddd at L5/s1 with mod foraminal narrowing  No enh on either of these studies  Per pt new lesion in c spine  Still need report  Will updated mri head  Pt to have udpated labs  Pt to have updated jcv  Rev all avail imd meds  Pt is most comfortable with copaxone tiw  Pt does have rx for tecfidera from [facility] but does not want this med at this time  Will submit rx for copaxone   Signed in office today  Pt had thryoidectomy in 2016 due to thyroid cancer  Pt is following up with her thyroid team, just seen less than one week ago

## 2018-08-14 NOTE — ASSESSMENT & PLAN NOTE
Pt here for neuro follow up  Pt last seen in dec 2015  Pt was [facility]  Pt had been on copaxone tiw just prior to conceiving  Pt off med for 2 yrs  Pt with one yr old son here in office today  Pt has been off imd meds since delivery  Pt did have tubal ligation done after last child  Pt with recent admission to [facility] for abn mri   Only have 2 reports presently ie mri lumbar and mri t  Pt with old chronic lesion at T10  Pt wihtout lesion at conus, she does have ddd at L5/s1 with mod foraminal narrowing  No enh on either of these studies  Per pt new lesion in c spine  Still need report  Will updated mri head  Pt to have udpated labs  Pt to have updated jcv  Rev all avail imd meds  Pt is most comfortable with copaxone tiw  Pt does have rx for tecfidera from [facility] but does not want this med at this time  Will submit rx for copaxone   Signed in office today  Pt had thryoidectomy in 2016 due to thyroid cancer  Pt is following up with her thyroid team, just seen less than one week ago

## 2018-08-15 ENCOUNTER — TELEPHONE (OUTPATIENT)
Dept: NEUROLOGY | Facility: CLINIC | Age: 38
End: 2018-08-15

## 2018-08-15 NOTE — TELEPHONE ENCOUNTER
Ruben, please see if we can get report of both c and head mri  If pt did have both done in aug, she does not need to go again    We just need the results to help with future decisions

## 2018-08-15 NOTE — TELEPHONE ENCOUNTER
Pt had MRI brain & brainstem w &w/o on 8/1 @[facility]  Pt questioning does she need to get this done again per Dr Preethi Marroquin, it does not appear that Dr Preethi Marroquin was aware    Ruben, can you please look into this result as well

## 2018-08-15 NOTE — TELEPHONE ENCOUNTER
Please recall [facility] to get mri c spine results from juy/ aug  We only got mri lumbar and t spine sent over yesterday   thanks

## 2018-08-20 NOTE — TELEPHONE ENCOUNTER
Call to [facility] radiology, Lisa Brown states he is faxing Cervical and Brain mri

## 2018-08-26 NOTE — PROGRESS NOTES
Please clarify with [facility]   Pt had mri t and l spine in early aug, but per pt she also had c spine done and this is where she had a new lesion  I still have not gotten a c spine report  At time of her appt in august, jefferson only got t and l spine when she called  I again requested mri reports after her appt and just got t and l spine imaging again  Please double check if an mri c spine was actually done and if not, we will need to order asap

## 2018-08-29 ENCOUNTER — TELEPHONE (OUTPATIENT)
Dept: NEUROLOGY | Facility: CLINIC | Age: 38
End: 2018-08-29

## 2018-08-29 NOTE — TELEPHONE ENCOUNTER
Let pt know that we were finally able to get all of her mri reports from [facility]   Please let her know the mri head did confirm one new lesion in the right corona radiata that was enhancing  However also noted is non specific focus of enh associated with the medial left pterygoid muscle and /or tensor veli palatini  This can represent inflammatory, infectious or neoplastic etiology per report and a follow up CT face and neck with iv contrast was highly recommended  Please check with pt if this was done and ordered and followed up on  This is unrelated to the [address] has copies of the imaging  She will put in media  We kept getting mri lumbar and c spine but not the head report  Finally got today  Pt was admittted at [facility]  Hoping she had follow up with CT

## 2018-08-30 ENCOUNTER — TELEPHONE (OUTPATIENT)
Dept: NEUROLOGY | Facility: CLINIC | Age: 38
End: 2018-08-30

## 2018-08-30 DIAGNOSIS — R90.89 ABNORMAL FINDING ON MRI OF BRAIN: Primary | ICD-10-CM

## 2018-08-30 NOTE — TELEPHONE ENCOUNTER
Emmy Rivers from [employer]  called asking status copaxone PA     Pls see denial letter (media) 8/28/18           [phone]

## 2018-08-31 ENCOUNTER — DOCUMENTATION (OUTPATIENT)
Dept: NEUROLOGY | Facility: CLINIC | Age: 38
End: 2018-08-31

## 2018-08-31 NOTE — TELEPHONE ENCOUNTER
april from [address]  called and said that copaxone needed a PA   made her aware that it was already denied and will be switching to glatiramer  spoke to pharmacist and gave verbal for glatiramer 40mg inject 40mg 3 times a week, qty 12 syringes and 11 refills

## 2018-08-31 NOTE — TELEPHONE ENCOUNTER
Made patient aware of switch to generic  Patient agreeable    Start form for glatiramer signed by Beck Horan and faxed to [facility] and [facility] [address]

## 2018-08-31 NOTE — PROGRESS NOTES
Received fax from [facility] requesting additional documentation to complete PA  Faxed  Scanned request to [facility]

## 2018-09-03 ENCOUNTER — TELEPHONE (OUTPATIENT)
Dept: NEUROLOGY | Facility: CLINIC | Age: 38
End: 2018-09-03

## 2018-09-03 NOTE — TELEPHONE ENCOUNTER
Please see my telephone message from 8/29  This was a message to you and Wanda Busby did follow up with pt  I just got the mri head now scanned into chart from [facility] which is reviewed on 8/29 as well  Looks like pt was unaware of the enh in the pterygoid area  Looks like any did notify the pt  Also looks like any order the ct head and neck  Please let pcp know of this abn and send the copy of mri head to pcp office attn mri head abn pterygoid area  I want pcp input into further work up for this as this is outside of our [facility]  Please check with pt when she is scheduled for the follow up testing and again need to follow up with her pcp for this in particular  Also let pt know it looks like there was a new enh lesion in the brain at time of her presentation to [place] on mri head  There is also question on mri c spine if new lesion near odontoid ie 12mm or artifact  rec pt to get updated mri c spine in 2 months to follow up on this question

## 2018-09-05 NOTE — TELEPHONE ENCOUNTER
Pt called asking to review MRI head result  She wants to know if MRI showed an infection? She was not clear on the explanation      Thanks    [phone]

## 2018-09-06 NOTE — TELEPHONE ENCOUNTER
Called and spoke with pt  Re-explained the MRI results  She is scheduled for the CT scan on 9/15  Will call PCP and send copy to them as well    She has an appt with PCP in about 2 weeks  Patient has appt with me on 10/18

## 2018-09-07 ENCOUNTER — TELEPHONE (OUTPATIENT)
Dept: NEUROLOGY | Facility: CLINIC | Age: 38
End: 2018-09-07

## 2018-09-07 NOTE — TELEPHONE ENCOUNTER
Fyi:    Pt called asking why MRI brain scheduled today was cancelled  She states that nobody notified her  Per Sabrina Anderson, she was not able to see the reason for the cancellation, "it's probably denied"  She states that Maryam Enciso is not in the office to confirm     CT facial and soft tissue neck scheduled 9/15         [phone]

## 2018-09-07 NOTE — TELEPHONE ENCOUNTER
Made patient aware of below  Patient has CT scheduled for 9/15  Reminded patient to have her labs drawn  Verbalizes understanding

## 2018-09-07 NOTE — TELEPHONE ENCOUNTER
See telephone message from 8/15  At patient's visit with Dr Heidi Yang, she was not aware patient had just done a brain MRI at [facility], which is why she ordered it  It was then discovered patient had just done the MRI in August, so no need for another one    That is why this test was cancelled

## 2018-09-11 ENCOUNTER — APPOINTMENT (OUTPATIENT)
Dept: LAB | Facility: CLINIC | Age: 38
End: 2018-09-11
Payer: COMMERCIAL

## 2018-09-11 DIAGNOSIS — G35 MULTIPLE SCLEROSIS (HCC): ICD-10-CM

## 2018-09-11 LAB
ALBUMIN SERPL BCP-MCNC: 3.6 G/DL (ref 3.5–5)
ALP SERPL-CCNC: 81 U/L (ref 46–116)
ALT SERPL W P-5'-P-CCNC: 30 U/L (ref 12–78)
AST SERPL W P-5'-P-CCNC: 17 U/L (ref 5–45)
BASOPHILS # BLD AUTO: 0.03 THOUSANDS/ΜL (ref 0–0.1)
BASOPHILS NFR BLD AUTO: 0 % (ref 0–1)
BILIRUB DIRECT SERPL-MCNC: 0.16 MG/DL (ref 0–0.2)
BILIRUB SERPL-MCNC: 0.6 MG/DL (ref 0.2–1)
BUN SERPL-MCNC: 7 MG/DL (ref 5–25)
CREAT SERPL-MCNC: 0.65 MG/DL (ref 0.6–1.3)
EOSINOPHIL # BLD AUTO: 0.06 THOUSAND/ΜL (ref 0–0.61)
EOSINOPHIL NFR BLD AUTO: 1 % (ref 0–6)
ERYTHROCYTE [DISTWIDTH] IN BLOOD BY AUTOMATED COUNT: 23.6 % (ref 11.6–15.1)
GFR SERPL CREATININE-BSD FRML MDRD: 131 ML/MIN/1.73SQ M
HCT VFR BLD AUTO: 33.7 % (ref 34.8–46.1)
HGB BLD-MCNC: 10.1 G/DL (ref 11.5–15.4)
IMM GRANULOCYTES # BLD AUTO: 0.02 THOUSAND/UL (ref 0–0.2)
IMM GRANULOCYTES NFR BLD AUTO: 0 % (ref 0–2)
LYMPHOCYTES # BLD AUTO: 1.61 THOUSANDS/ΜL (ref 0.6–4.47)
LYMPHOCYTES NFR BLD AUTO: 24 % (ref 14–44)
MCH RBC QN AUTO: 21.6 PG (ref 26.8–34.3)
MCHC RBC AUTO-ENTMCNC: 30 G/DL (ref 31.4–37.4)
MCV RBC AUTO: 72 FL (ref 82–98)
MONOCYTES # BLD AUTO: 0.45 THOUSAND/ΜL (ref 0.17–1.22)
MONOCYTES NFR BLD AUTO: 7 % (ref 4–12)
NEUTROPHILS # BLD AUTO: 4.51 THOUSANDS/ΜL (ref 1.85–7.62)
NEUTS SEG NFR BLD AUTO: 68 % (ref 43–75)
NRBC BLD AUTO-RTO: 0 /100 WBCS
PLATELET # BLD AUTO: 456 THOUSANDS/UL (ref 149–390)
PMV BLD AUTO: 9.3 FL (ref 8.9–12.7)
PROT SERPL-MCNC: 7.6 G/DL (ref 6.4–8.2)
RBC # BLD AUTO: 4.67 MILLION/UL (ref 3.81–5.12)
WBC # BLD AUTO: 6.68 THOUSAND/UL (ref 4.31–10.16)

## 2018-09-11 PROCEDURE — 86618 LYME DISEASE ANTIBODY: CPT

## 2018-09-11 PROCEDURE — 86235 NUCLEAR ANTIGEN ANTIBODY: CPT

## 2018-09-11 PROCEDURE — 84520 ASSAY OF UREA NITROGEN: CPT

## 2018-09-11 PROCEDURE — 80076 HEPATIC FUNCTION PANEL: CPT

## 2018-09-11 PROCEDURE — 82565 ASSAY OF CREATININE: CPT

## 2018-09-11 PROCEDURE — 85025 COMPLETE CBC W/AUTO DIFF WBC: CPT

## 2018-09-11 PROCEDURE — 36415 COLL VENOUS BLD VENIPUNCTURE: CPT

## 2018-09-12 ENCOUNTER — TELEPHONE (OUTPATIENT)
Dept: NEUROLOGY | Facility: CLINIC | Age: 38
End: 2018-09-12

## 2018-09-12 LAB
B BURGDOR IGG SER IA-ACNC: 0.08
B BURGDOR IGM SER IA-ACNC: 0.55
ENA SS-A AB SER-ACNC: <0.2 AI (ref 0–0.9)
ENA SS-B AB SER-ACNC: <0.2 AI (ref 0–0.9)

## 2018-09-12 NOTE — TELEPHONE ENCOUNTER
----- Message from Kavin Arreguin MD sent at 9/11/2018 12:45 PM EDT -----  letpt know she remains anemic and also still with elevated platlets    Send labs to pcp and have pt follow up with pcp

## 2018-09-13 ENCOUNTER — APPOINTMENT (OUTPATIENT)
Dept: LAB | Facility: CLINIC | Age: 38
End: 2018-09-13
Payer: COMMERCIAL

## 2018-09-13 ENCOUNTER — TRANSCRIBE ORDERS (OUTPATIENT)
Dept: LAB | Facility: CLINIC | Age: 38
End: 2018-09-13

## 2018-09-13 DIAGNOSIS — C73 MALIGNANT NEOPLASM OF THYROID GLAND (HCC): ICD-10-CM

## 2018-09-13 DIAGNOSIS — C73 MALIGNANT NEOPLASM OF THYROID GLAND (HCC): Primary | ICD-10-CM

## 2018-09-13 LAB
T3FREE SERPL-MCNC: 5.25 PG/ML (ref 2.3–4.2)
T4 FREE SERPL-MCNC: 2.59 NG/DL (ref 0.76–1.46)
TSH SERPL DL<=0.05 MIU/L-ACNC: <0.007 UIU/ML (ref 0.36–3.74)

## 2018-09-13 PROCEDURE — 84481 FREE ASSAY (FT-3): CPT

## 2018-09-13 PROCEDURE — 84439 ASSAY OF FREE THYROXINE: CPT

## 2018-09-13 PROCEDURE — 84432 ASSAY OF THYROGLOBULIN: CPT

## 2018-09-13 PROCEDURE — 86800 THYROGLOBULIN ANTIBODY: CPT

## 2018-09-13 PROCEDURE — 84443 ASSAY THYROID STIM HORMONE: CPT

## 2018-09-13 PROCEDURE — 36415 COLL VENOUS BLD VENIPUNCTURE: CPT

## 2018-09-15 ENCOUNTER — HOSPITAL ENCOUNTER (OUTPATIENT)
Dept: CT IMAGING | Facility: HOSPITAL | Age: 38
Discharge: HOME | End: 2018-09-15
Payer: COMMERCIAL

## 2018-09-15 DIAGNOSIS — R90.89 ABNORMAL FINDING ON MRI OF BRAIN: ICD-10-CM

## 2018-09-15 PROCEDURE — 70491 CT SOFT TISSUE NECK W/DYE: CPT

## 2018-09-15 PROCEDURE — 70487 CT MAXILLOFACIAL W/DYE: CPT

## 2018-09-15 RX ADMIN — IOHEXOL 85 ML: 350 INJECTION, SOLUTION INTRAVENOUS at 13:45

## 2018-09-18 ENCOUNTER — TELEPHONE (OUTPATIENT)
Dept: NEUROLOGY | Facility: CLINIC | Age: 38
End: 2018-09-18

## 2018-09-18 LAB
THYROGLOB AB SERPL-ACNC: 22.9 IU/ML (ref 0–0.9)
THYROGLOB SERPL-MCNC: 2.1 NG/ML

## 2018-09-18 NOTE — TELEPHONE ENCOUNTER
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

## 2018-09-18 NOTE — TELEPHONE ENCOUNTER
Patient made aware, per Viktoriya's note  CT scan is stable, no reoccurrence of thyroid tumor  Imaging result forwarded to PCP's office  Also may want to discuss f/u with ENT  Patient verbalized understanding

## 2018-10-01 ENCOUNTER — TELEPHONE (OUTPATIENT)
Dept: NEUROLOGY | Facility: CLINIC | Age: 38
End: 2018-10-01

## 2018-10-01 NOTE — TELEPHONE ENCOUNTER
Pt called stated that her insurance covered brand copaxone and they are shipping this to her Friday  She is unsure what changed and why this was denied at first, but she received a call that the brand is now covered

## 2018-10-01 NOTE — TELEPHONE ENCOUNTER
Pt states that she received a shut off notice from [facility]  Shut off scheduled for 10/9/18  Advised the writer to contact [facility] at [phone]  Attempted to call [facility] but the # provided was wrong  Called and Left a message on pt's answering machine for a call back  Clinical team: when pt calls back, pls ask [facility] contact#              [facility] account# [de-identified]

## 2018-10-01 NOTE — TELEPHONE ENCOUNTER
Pt called back and provided the number , then she conferenced me with med ed, fax number provided, and they will fax us over the form

## 2018-10-02 ENCOUNTER — HOSPITAL ENCOUNTER (EMERGENCY)
Facility: HOSPITAL | Age: 38
Discharge: HOME | End: 2018-10-02
Attending: EMERGENCY MEDICINE | Admitting: EMERGENCY MEDICINE
Payer: COMMERCIAL

## 2018-10-02 ENCOUNTER — TELEPHONE (OUTPATIENT)
Dept: NEUROLOGY | Facility: CLINIC | Age: 38
End: 2018-10-02

## 2018-10-02 VITALS
WEIGHT: 167 LBS | DIASTOLIC BLOOD PRESSURE: 81 MMHG | SYSTOLIC BLOOD PRESSURE: 123 MMHG | HEART RATE: 100 BPM | BODY MASS INDEX: 29.58 KG/M2 | RESPIRATION RATE: 18 BRPM | OXYGEN SATURATION: 99 % | TEMPERATURE: 98.6 F

## 2018-10-02 DIAGNOSIS — T78.40XA ACUTE ALLERGIC REACTION: Primary | ICD-10-CM

## 2018-10-02 DIAGNOSIS — L50.9 URTICARIA: ICD-10-CM

## 2018-10-02 PROCEDURE — 99283 EMERGENCY DEPT VISIT LOW MDM: CPT

## 2018-10-02 PROCEDURE — 96372 THER/PROPH/DIAG INJ SC/IM: CPT

## 2018-10-02 RX ORDER — FAMOTIDINE 20 MG/1
20 TABLET, FILM COATED ORAL ONCE
Status: COMPLETED | OUTPATIENT
Start: 2018-10-02 | End: 2018-10-02

## 2018-10-02 RX ORDER — KETOROLAC TROMETHAMINE 30 MG/ML
15 INJECTION, SOLUTION INTRAMUSCULAR; INTRAVENOUS ONCE
Status: COMPLETED | OUTPATIENT
Start: 2018-10-02 | End: 2018-10-02

## 2018-10-02 RX ORDER — DIPHENHYDRAMINE HCL 25 MG
CAPSULE ORAL
Qty: 40 CAPSULE | Refills: 0 | Status: SHIPPED | OUTPATIENT
Start: 2018-10-02 | End: 2021-10-06

## 2018-10-02 RX ORDER — FAMOTIDINE 20 MG/1
20 TABLET, FILM COATED ORAL 2 TIMES DAILY
Qty: 30 TABLET | Refills: 0 | Status: SHIPPED | OUTPATIENT
Start: 2018-10-02 | End: 2021-10-06

## 2018-10-02 RX ADMIN — FAMOTIDINE 20 MG: 20 TABLET ORAL at 08:49

## 2018-10-02 RX ADMIN — KETOROLAC TROMETHAMINE 15 MG: 30 INJECTION, SOLUTION INTRAMUSCULAR at 09:00

## 2018-10-02 NOTE — ED NOTES
Patient reports having "tubes tied" and menstruating last week  Unable to void at this time for a POC preg test  PA made aware   Toradol given      Beti Flores RN  10/02/18 8611

## 2018-10-02 NOTE — TELEPHONE ENCOUNTER
Pt called to report she injected Copaxone yesterday @ 10pm and woke up with red, raised, burning rash on her back, BL arms & legs  Restarted Copaxone approx 3 weeks ago  Denies any other sx  No other new meds  Pt spoke with [employer] who recommended she contact our office or go to ED  Pt states just arrived at ED  Please advise  Pt cell [phone]  Ok to leave detailed message

## 2018-10-02 NOTE — TELEPHONE ENCOUNTER
pt called back  no other new meds  she states that the ER believes that the rash may be from the copaxone  made her aware to dc copaxone  she will call pcp for further follow up  i rescheduled pt's 10/18 appt to 10/5 with ruy

## 2018-10-02 NOTE — TELEPHONE ENCOUNTER
Agree with going to er  Any other new meds? If not other new meds, will need to discontinue copaxone  We can then bring in for follow up visit to discuss new med with ruy or any guadarrama acute management of rash with er and pcp and discontinuation of the copaxone unless they find another culprit

## 2018-10-02 NOTE — DISCHARGE INSTRUCTIONS
FU with your rheumatologist medication as directed  Return to the ED for worsening symptoms  Acute Rash   WHAT YOU NEED TO KNOW:   A rash is irritation, redness, or itchiness in the skin or mucus membranes  Mucus membranes are areas such as the lining of your nose or throat  Acute means the rash starts suddenly, worsens quickly, and lasts a short time  An acute rash may be caused by a disease, such as hepatitis or vasculitis  The rash may be a reaction to something you are allergic to, such as certain foods, or latex  Certain medicines, including antibiotics, NSAIDs, prescription pain medicine, and aspirin can also cause a rash  DISCHARGE INSTRUCTIONS:   Return to the emergency department if:   · You have sudden trouble breathing or chest pain  · You are vomiting, have a headache or muscle aches, and your throat hurts  Contact your healthcare provider if:   · You have a fever  · You get open wounds from scratching your skin, or you have a wound that is red, swollen, or painful  · Your rash lasts longer than 3 months  · You have swelling or pain in your joints  · You have questions or concerns about your condition or care  Medicines:  If your rash does not go away on its own, you may need the following medicines:  · Antihistamines  may be given to help decrease itching  · Steroids  may be given to decrease inflammation  · Antibiotics  help fight or prevent a bacterial infection  · Take your medicine as directed  Contact your healthcare provider if you think your medicine is not helping or if you have side effects  Tell him of her if you are allergic to any medicine  Keep a list of the medicines, vitamins, and herbs you take  Include the amounts, and when and why you take them  Bring the list or the pill bottles to follow-up visits  Carry your medicine list with you in case of an emergency  Prevent a rash or care for your skin when you have a rash:  Dry skin can lead to more problems  Do not scratch your skin if it itches  You may cause a skin infection by scratching  The following may prevent dry skin, and help your skin look better:  · Use thick cream lotions or petroleum jelly to help soothe your rash  These products work well on areas with thick skin, such as your feet  Cool compresses may also be used to soothe your skin  Apply a cool compress or a cool, wet towel, and then cover it with a dry towel  · Use lukewarm water when you bathe  Hot water may damage your skin more  Pat your skin dry  Do not rub your skin with a towel  · Use detergents, soaps, shampoos, and bubble baths made for sensitive skin  Wear clothes that are made of cotton instead of nylon or wool  Cotton is softer, so it will not hurt your skin as much  Follow up with your healthcare provider as directed: You may need to see a dermatologist if healthcare providers do not know what is causing your rash  You may also need to see a dermatologist if your rash does not get better even with treatment  You may need to see a dietitian if you have allergies to foods  Write down your questions so you remember to ask them during your visits  © 2017 [employer]  Information is for End User's use only and may not be sold, redistributed or otherwise used for commercial purposes  All illustrations and images included in CareNotes® are the copyrighted property of [employer]  or Moises Hairston  The above information is an  only  It is not intended as medical advice for individual conditions or treatments  Talk to your doctor, nurse or pharmacist before following any medical regimen to see if it is safe and effective for you  Urticaria   WHAT YOU NEED TO KNOW:   Urticaria is also called hives  Hives can change size and shape, and appear anywhere on your skin  They can be mild or severe and last from a few minutes to a few days   Hives may be a sign of a severe allergic reaction called anaphylaxis that needs immediate treatment  Urticaria that lasts longer than 6 weeks may be a chronic condition that needs long-term treatment  DISCHARGE INSTRUCTIONS:   Call 911 for signs or symptoms of anaphylaxis,  such as trouble breathing, swelling in your mouth or throat, or wheezing  You may also have itching, a rash, or feel like you are going to faint  Return to the emergency department if:   · Your heart is beating faster than it normally does  · You have cramping or severe pain in your abdomen  Contact your healthcare provider if:   · You have a fever  · Your skin still itches 24 hours after you take your medicine  · You still have hives after 7 days  · Your joints are painful and swollen  · You have questions or concerns about your condition or care  Medicines:   · Epinephrine  is used to treat severe allergic reactions such as anaphylaxis  · Antihistamines  decrease mild symptoms such as itching or a rash  · Steroids  decrease redness, pain, and swelling  · Take your medicine as directed  Contact your healthcare provider if you think your medicine is not helping or if you have side effects  Tell him of her if you are allergic to any medicine  Keep a list of the medicines, vitamins, and herbs you take  Include the amounts, and when and why you take them  Bring the list or the pill bottles to follow-up visits  Carry your medicine list with you in case of an emergency  Steps to take for signs or symptoms of anaphylaxis:   · Immediately  give 1 shot of epinephrine only into the outer thigh muscle  · Leave the shot in place  as directed  Your healthcare provider may recommend you leave it in place for up to 10 seconds before you remove it  This helps make sure all of the epinephrine is delivered  · Call 911 and go to the emergency department,  even if the shot improved symptoms  Do not drive yourself  Bring the used epinephrine shot with you    Safety precautions to take if you are at risk for anaphylaxis:   · Keep 2 shots of epinephrine with you at all times  You may need a second shot, because epinephrine only works for about 20 minutes and symptoms may return  Your healthcare provider can show you and family members how to give the shot  Check the expiration date every month and replace it before it expires  · Create an action plan  Your healthcare provider can help you create a written plan that explains the allergy and an emergency plan to treat a reaction  The plan explains when to give a second epinephrine shot if symptoms return or do not improve after the first  Give copies of the action plan and emergency instructions to family members, work and school staff, and  providers  Show them how to give a shot of epinephrine  · Be careful when you exercise  If you have had exercise-induced anaphylaxis, do not exercise right after you eat  Stop exercising right away if you start to develop any signs or symptoms of anaphylaxis  You may first feel tired, warm, or have itchy skin  Hives, swelling, and severe breathing problems may develop if you continue to exercise  · Carry medical alert identification  Wear medical alert jewelry or carry a card that explains the allergy  Ask your healthcare provider where to get these items  · Keep a record of triggers and symptoms  Record everything you eat, drink, or apply to your skin for 3 weeks  Include stressful events and what you were doing right before your hives started  Bring the record with you to follow-up visits with your healthcare provider  Manage urticaria:   · Cool your skin  This may help decrease itching  Apply a cool pack to your hives  Dip a hand towel in cool water, wring it out, and place it on your hives  You may also soak your skin in a cool oatmeal bath  · Do not rub your hives  This can irritate your skin and cause more hives  · Wear loose clothing    Tight clothes may irritate your skin and cause more hives  · Manage stress  Stress may trigger hives, or make them worse  Learn new ways to relax, such as deep breathing  Follow up with your healthcare provider as directed:  Write down your questions so you remember to ask them during your visits  © 2017 [employer] Information is for End User's use only and may not be sold, redistributed or otherwise used for commercial purposes  All illustrations and images included in CareNotes® are the copyrighted property of [employer]  or [facility]  The above information is an  only  It is not intended as medical advice for individual conditions or treatments  Talk to your doctor, nurse or pharmacist before following any medical regimen to see if it is safe and effective for you  General Allergic Reaction   WHAT YOU NEED TO KNOW:   An allergic reaction is your body's response to an allergen  Allergens include medicines, food, insect stings, animal dander, mold, latex, chemicals, and dust mites  Pollen from trees, grass, and weeds can also cause an allergic reaction  DISCHARGE INSTRUCTIONS:   Return to the emergency department if:   · You have a skin rash, hives, swelling, or itching that gets worse  · You have trouble breathing, shortness of breath, wheezing, or coughing  · Your throat tightens, or your lips or tongue swell  · You have trouble swallowing or speaking  · You have dizziness, lightheadedness, fainting, or confusion  · You have nausea, vomiting, diarrhea, or abdominal cramps  · You have chest pain or tightness  Contact your healthcare provider if:   · You have questions or concerns about your condition or care  Medicines:   · Medicines  may be given to relieve certain allergy symptoms such as itching, sneezing, and swelling  You may take them as a pill or use drops in your nose or eyes   Topical treatments may be given to put directly on your skin to help decrease itching or swelling  · Take your medicine as directed  Contact your healthcare provider if you think your medicine is not helping or if you have side effects  Tell him of her if you are allergic to any medicine  Keep a list of the medicines, vitamins, and herbs you take  Include the amounts, and when and why you take them  Bring the list or the pill bottles to follow-up visits  Carry your medicine list with you in case of an emergency  Follow up with your healthcare provider as directed:  Write down your questions so you remember to ask them during your visits  Self-care:   · Avoid the allergen  that you think may have caused your allergic reaction  · Use cold compresses  on your skin or eyes if they were affected by the allergic reaction  Cold compresses may help to soothe your skin or eyes  · Rinse your nasal passages  with a saline solution  Daily rinsing may help clear your nose of allergens  · Do not smoke  Your allergy symptoms may decrease if you are not around smoke  Nicotine and other chemicals in cigarettes and cigars can also cause lung damage  Ask your healthcare provider for information if you currently smoke and need help to quit  E-cigarettes or smokeless tobacco still contain nicotine  Talk to your healthcare provider before you use these products  © 2017 [employer]  Information is for End User's use only and may not be sold, redistributed or otherwise used for commercial purposes  All illustrations and images included in CareNotes® are the copyrighted property of [employer]  or [employer] Hairst[employer]  The above information is an  only  It is not intended as medical advice for individual conditions or treatments  Talk to your doctor, nurse or pharmacist before following any medical regimen to see if it is safe and effective for you

## 2018-10-02 NOTE — ED PROVIDER NOTES
History  Chief Complaint   Patient presents with    Allergic Reaction     pt had Copaxone injection yesterday for MS, has had injection without problems prior  this morning pt woke up with generalized hives and severe headache  has not taken any medication for allergic reaction thus far  Patient presents to the emergency department with hives that were present when she woke up this morning  Patient has a history of  She is on Copaxone - she was on it prior but stopped for over a year because she was pregnant but recently started it again about 3-4 weeks ago  She states she does the injection 3 days a week  She gave herself an injection last night at 10 o'clock and went right to bed  She woke up at 6:45 a m  with itching and burning all over had noticed the hives  She called her doctor and while she was here during my evaluation the nurse called her back and told her to stop the Copaxone and the office will call her back  Patient did not eat anything unusual yesterday for dinner last night she had elam chicken which is not anything new for her  No fruits  She did not have any evening snack  Patient is on Synthroid which she has been on for years and took that yesterday morning  She also takes Xanax as needed and took that at about 5 o'clock yesterday nothing today  Patient also reports a headache  Prior to Admission Medications   Prescriptions Last Dose Informant Patient Reported? Taking? ALPRAZolam (XANAX) 1 mg tablet   Yes No   Sig: TAKE ONE TABLET BY MOUTH 4 TIMES A DAY AS NEEDED   Butalbital-APAP-Caffeine (FIORICET) -40 MG CAPS   Yes No   Sig: Take by mouth   Dimethyl Fumarate (TECFIDERA) 120 & 240 MG MISC  Self Yes No   Sig: Take by mouth   amitriptyline (ELAVIL) 25 mg tablet   Yes No   Sig: Take 1 tablet by mouth   cholecalciferol (VITAMIN D3) 400 units tablet   Yes No   Sig: TAKE 1 TABLET(S) EVERY DAY BY ORAL ROUTE WITH MEALS FOR 30 DAYS     ferrous sulfate 325 (65 Fe) mg tablet   No No   Sig: Take 1 tablet (325 mg total) by mouth daily   lamoTRIgine (LaMICtal) 100 mg tablet   Yes No   Sig: TAKE 2 TABLETS BY MOUTH EVERY MORNING AND TAKE ONE TABLET DAILY AT 5PM   levothyroxine 175 mcg tablet   Yes No   Si mcg daily   methimazole (TAPAZOLE) 10 mg tablet   Yes No   Sig: Take 3 tablets by mouth daily   traZODone (DESYREL) 100 mg tablet   Yes No   Sig: Take 100 mg by mouth daily at bedtime as needed   venlafaxine (EFFEXOR-XR) 75 mg 24 hr capsule   Yes No   Sig: Take 75 mg by mouth every morning      Facility-Administered Medications: None       Past Medical History:   Diagnosis Date    Multiple sclerosis ([facility] )     Thyroid cancer ([facility] )        Past Surgical History:   Procedure Laterality Date    THYROIDECTOMY         History reviewed  No pertinent family history  I have reviewed and agree with the history as documented  Social History   Substance Use Topics    Smoking status: Former Smoker    Smokeless tobacco: Never Used    Alcohol use No        Review of Systems   All other systems reviewed and are negative  Physical Exam  Physical Exam   Constitutional: She appears well-developed and well-nourished  HENT:   Head: Normocephalic and atraumatic  Mouth/Throat: Oropharynx is clear and moist    No oral swelling or lip swelling  Eyes: Conjunctivae and EOM are normal    Neck: Neck supple  Cardiovascular: Normal rate, regular rhythm and normal heart sounds  Pulmonary/Chest: Effort normal and breath sounds normal    Abdominal: Soft  Skin: Skin is warm  Diffuse urticaria especially to arms and legs his lesser amount to trunk   Psychiatric: She has a normal mood and affect  Nursing note and vitals reviewed        Vital Signs  ED Triage Vitals [10/02/18 0825]   Temperature Pulse Respirations Blood Pressure SpO2   98 6 °F (37 °C) 100 18 123/81 99 %      Temp Source Heart Rate Source Patient Position - Orthostatic VS BP Location FiO2 (%)   Oral Monitor -- -- -- Pain Score       --           Vitals:    10/02/18 0825   BP: 123/81   Pulse: 100       Visual Acuity      ED Medications  Medications   famotidine (PEPCID) tablet 20 mg (20 mg Oral Given 10/2/18 0849)   ketorolac (TORADOL) injection 15 mg (15 mg Intramuscular Given 10/2/18 0900)       Diagnostic Studies  Results Reviewed     Procedure Component Value Units Date/Time    POCT pregnancy, urine [[id number]]     Lab Status:  No result                  No orders to display              Procedures  Procedures       Phone Contacts  ED Phone Contact    ED Course                               MDM  Number of Diagnoses or Management Options  Acute allergic reaction: new and does not require workup  Urticaria: new and does not require workup  Diagnosis management comments: Discussed treatment options with patient  Discussed risks versus benefit of steroids  She declines on taking any steroids that she does occasionally need prednisone for her MS  Patient drove here declines Benadryl here will take it when she gets home  Will monitor patient  Risk of Complications, Morbidity, and/or Mortality  General comments: Toradol completely resolved headache  Patient's urticaria it is no worse and appears to be starting to improve  Patient Progress  Patient progress: improved    CritCare Time    Disposition  Final diagnoses:   Acute allergic reaction   Urticaria     Time reflects when diagnosis was documented in both MDM as applicable and the Disposition within this note     Time User Action Codes Description Comment    10/2/2018  9:19 AM Berta Ann [T78 [id number]] Acute allergic reaction     10/2/2018  9:19 AM Berta Ann [L50 9] Urticaria       ED Disposition     ED Disposition Condition Comment    Discharge  Ailyn Vanegas discharge to home/self care      Condition at discharge: Good        Follow-up Information     Follow up With Specialties Details Why Pelon Richardson MD Family Medicine   [id number]  [phone]            Discharge Medication List as of 10/2/2018  9:20 AM      START taking these medications    Details   diphenhydrAMINE (BENADRYL) 25 mg capsule 1-2 PO Q6hrs PRN itching, Normal      famotidine (PEPCID) 20 mg tablet Take 1 tablet (20 mg total) by mouth 2 (two) times a day, Starting Tue 10/2/2018, Normal         CONTINUE these medications which have NOT CHANGED    Details   ALPRAZolam (XANAX) 1 mg tablet TAKE ONE TABLET BY MOUTH 4 TIMES A DAY AS NEEDED, Historical Med      amitriptyline (ELAVIL) 25 mg tablet Take 1 tablet by mouth, Starting Mon 12/21/2015, Historical Med      Butalbital-APAP-Caffeine (FIORICET) -40 MG CAPS Take by mouth, Starting Mon 12/21/2015, Historical Med      cholecalciferol (VITAMIN D3) 400 units tablet TAKE 1 TABLET(S) EVERY DAY BY ORAL ROUTE WITH MEALS FOR 30 DAYS , Historical Med      Dimethyl Fumarate (TECFIDERA) 120 & 240 MG MISC Take by mouth, Historical Med      ferrous sulfate 325 (65 Fe) mg tablet Take 1 tablet (325 mg total) by mouth daily, Starting Thu 4/19/2018, Print      lamoTRIgine (LaMICtal) 100 mg tablet TAKE 2 TABLETS BY MOUTH EVERY MORNING AND TAKE ONE TABLET DAILY AT 5PM, Historical Med      levothyroxine 175 mcg tablet 350 mcg daily, Starting Fri 8/10/2018, Historical Med      methimazole (TAPAZOLE) 10 mg tablet Take 3 tablets by mouth daily, Historical Med      traZODone (DESYREL) 100 mg tablet Take 100 mg by mouth daily at bedtime as needed, Starting Mon 7/16/2018, Historical Med      venlafaxine (EFFEXOR-XR) 75 mg 24 hr capsule Take 75 mg by mouth every morning, Starting Fri 6/22/2018, Historical Med           No discharge procedures on file      ED Provider  Electronically Signed by           Erika Carrasco PA-C  10/02/18 2488

## 2018-10-04 NOTE — TELEPHONE ENCOUNTER
pt called regarding medical certificate for copaxone from [facility] ed   she states that they never received form  i do not see that we received this fax  i called [facility] ed and spoke to [facility] ELVIN CANELA  she will refax form to [phone]   are you willing to complete form?

## 2018-10-05 NOTE — TELEPHONE ENCOUNTER
Form received and filled out  Scanned to Ruben Nolen's email to have Dr Trena Willoughby sign  Once signed please fax

## 2018-10-10 ENCOUNTER — TRANSCRIBE ORDERS (OUTPATIENT)
Dept: LAB | Facility: CLINIC | Age: 38
End: 2018-10-10

## 2018-10-10 ENCOUNTER — APPOINTMENT (OUTPATIENT)
Dept: LAB | Facility: CLINIC | Age: 38
End: 2018-10-10
Payer: COMMERCIAL

## 2018-10-10 ENCOUNTER — OFFICE VISIT (OUTPATIENT)
Dept: NEUROLOGY | Facility: CLINIC | Age: 38
End: 2018-10-10
Payer: COMMERCIAL

## 2018-10-10 VITALS
BODY MASS INDEX: 25.11 KG/M2 | DIASTOLIC BLOOD PRESSURE: 70 MMHG | WEIGHT: 160 LBS | SYSTOLIC BLOOD PRESSURE: 122 MMHG | HEIGHT: 67 IN | HEART RATE: 80 BPM

## 2018-10-10 DIAGNOSIS — G35 MULTIPLE SCLEROSIS (HCC): Primary | ICD-10-CM

## 2018-10-10 DIAGNOSIS — G35 MULTIPLE SCLEROSIS (HCC): ICD-10-CM

## 2018-10-10 LAB
ALBUMIN SERPL BCP-MCNC: 3.5 G/DL (ref 3.5–5)
ALP SERPL-CCNC: 86 U/L (ref 46–116)
ALT SERPL W P-5'-P-CCNC: 67 U/L (ref 12–78)
AST SERPL W P-5'-P-CCNC: 33 U/L (ref 5–45)
BASOPHILS # BLD AUTO: 0.04 THOUSANDS/ΜL (ref 0–0.1)
BASOPHILS NFR BLD AUTO: 1 % (ref 0–1)
BILIRUB DIRECT SERPL-MCNC: 0.16 MG/DL (ref 0–0.2)
BILIRUB SERPL-MCNC: 0.8 MG/DL (ref 0.2–1)
EOSINOPHIL # BLD AUTO: 0.1 THOUSAND/ΜL (ref 0–0.61)
EOSINOPHIL NFR BLD AUTO: 3 % (ref 0–6)
ERYTHROCYTE [DISTWIDTH] IN BLOOD BY AUTOMATED COUNT: 22.9 % (ref 11.6–15.1)
HCT VFR BLD AUTO: 33.6 % (ref 34.8–46.1)
HGB BLD-MCNC: 9.9 G/DL (ref 11.5–15.4)
IMM GRANULOCYTES # BLD AUTO: 0 THOUSAND/UL (ref 0–0.2)
IMM GRANULOCYTES NFR BLD AUTO: 0 % (ref 0–2)
LYMPHOCYTES # BLD AUTO: 1.77 THOUSANDS/ΜL (ref 0.6–4.47)
LYMPHOCYTES NFR BLD AUTO: 51 % (ref 14–44)
MCH RBC QN AUTO: 20.5 PG (ref 26.8–34.3)
MCHC RBC AUTO-ENTMCNC: 29.5 G/DL (ref 31.4–37.4)
MCV RBC AUTO: 69 FL (ref 82–98)
MONOCYTES # BLD AUTO: 0.29 THOUSAND/ΜL (ref 0.17–1.22)
MONOCYTES NFR BLD AUTO: 8 % (ref 4–12)
NEUTROPHILS # BLD AUTO: 1.28 THOUSANDS/ΜL (ref 1.85–7.62)
NEUTS SEG NFR BLD AUTO: 37 % (ref 43–75)
NRBC BLD AUTO-RTO: 0 /100 WBCS
PLATELET # BLD AUTO: 537 THOUSANDS/UL (ref 149–390)
PMV BLD AUTO: 9.5 FL (ref 8.9–12.7)
PROT SERPL-MCNC: 7.5 G/DL (ref 6.4–8.2)
RBC # BLD AUTO: 4.84 MILLION/UL (ref 3.81–5.12)
WBC # BLD AUTO: 3.48 THOUSAND/UL (ref 4.31–10.16)

## 2018-10-10 PROCEDURE — 85025 COMPLETE CBC W/AUTO DIFF WBC: CPT

## 2018-10-10 PROCEDURE — 36415 COLL VENOUS BLD VENIPUNCTURE: CPT

## 2018-10-10 PROCEDURE — 80076 HEPATIC FUNCTION PANEL: CPT

## 2018-10-10 PROCEDURE — 99214 OFFICE O/P EST MOD 30 MIN: CPT | Performed by: PHYSICIAN ASSISTANT

## 2018-10-10 RX ORDER — GLATIRAMER ACETATE 40 MG/ML
INJECTION, SOLUTION SUBCUTANEOUS
COMMUNITY
Start: 2018-10-01 | End: 2018-10-10 | Stop reason: SINTOL

## 2018-10-10 NOTE — PROGRESS NOTES
Patient ID: Declan Adamson is a 41 y o  female  Assessment/Plan:    Multiple sclerosis ([facility] )  Patient restarted Copaxone after last visit  She had been on this med in the past, but off all meds from [id number]  She went back on the Copaxone for a few weeks and then on 10/2/18 she woke up in the AM with hives, itching and burning all over her body  She also had a HA  No other new meds, foods etc   ER exam noted diffuse urticarial over extremities, less over trunk  She had taken her Copaxone injection the night before this reaction occurred  Patient was told to stop the Copaxone and come in today to discuss other IMD options  She had been on Tecfidera initially before changing to Copaxone  She has not had any updated JCV testing done  She was negative in 2015  She has not wanted any meds associated with PML  Discussed other medication options with the patient today  I would be hesitant to give her Interferons due to her depression history and thyroid issues  She had a tubal ligation, so could give her Aubagio  Aubagio discussed in detail including common side effects, safety info, monitoring  She would like to try Aubagio  Start for signed today  I also gave her a hand written script for monthly CBC and LFTs to start after initiating Aubagio  She will also need baseline labs now, including TB Quantiferon Gold, LFTs  Will also get her JCV testing done at [facility], script given  Her exam is stable today  She will follow up in 3 months or sooner if needed  She will call for any new or worsening symptoms  Diagnoses and all orders for this visit:    Multiple sclerosis ([facility] )  -     Varicella zoster antibody, IgG; Future  -     Quantiferon TB Gold Plus; Future  -     Hepatic function panel; Future    Other orders  -     Discontinue: COPAXONE 40 MG/ML SOSY;            Subjective:    HPI    Patient is a 41year old female with PMH of MS diagnosed in May 2015 by Dr Annelle Hatchet from neurology    She was initially on Tecfidera but then changed to Copaxone  She was seen by our office just twice in 2015 and then changed practices to [facility]  She took the Copaxone until conceiving her son in 2016  Patient then returned back to our practice recently in August 2018  She was off IMD therapy still from prior to conceiving her son, even off since discontinuing breastfeeding after 6 months  Patient reported she had a script for Tecfidera from her other neurologist, but wanted to go back on Copaxone  Patient had a tubal ligation done after her son was born  Also since she was last seen here in 2015, she was diagnosed with thyroid cancer and had a thyroidectomy in 2016  Patient had all imaging updated at [facility] just prior to re-establishing with our office  MRI brain 8/1/18 demonstrated moderate degree of demyelinating plaque with one new, enhancing lesion in the right corona radiata, representing active demyelination  There was also mention of an abnormal area of enhancement associated with the medial left pterygoid muscle and/or tensor veli palatine measuring approx  1 8x1  7x1 2cm  This could represent inflammatory, infectious, or neoplastic etiology  Further eval with CT of the face and neck with contrast highly recommended  MRI c-spine 8/1/18 with demyelinating plaque of the proximal cord at the mid odontoid level measuring approx  12mm in length, no enhancement  MRI t-spine 8/9/18 with solitary 1 6cm lesion in the cord at the level of T10-11 with minimal associated volume loss and no enhancement  MRI lumbar spine 8/9/18 with normal cauda equina and conus  Degenerative changes at L5-S1 resulting in mild lateral recess and moderate neural foraminal stenosis  No central stenosis  Patient was informed of the results  Due to the incidental finding of enhancement of the left pterygoid muscle, CT facial bones and CT neck with contrast were ordered    On CT facial bones, 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, 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  CT neck was normal, no recurrent thyroid tumor  No mention of abnormality that was seen on MRI from [facility]     Since last visit, she did restart Copaxone  About 3-4 weeks after starting the med, she had what was thought to be a possible allergic reaction  She presented to the ER on 10/1/18 and reported that she gave herself a Copaxone injection at about 10pm the night prior and at 6:45am she woke up with hives, itching and burning all over her body  She also had a HA  No other new meds, foods etc   ER exam noted diffuse urticarial over extremities, less over trunk  She declined steroids or Benadryl in the ER but was given toradol and Pepcid  She took Benadryl when she got home  Patient called our office and was told to stop taking the med and make an appt to discuss other medication options  Patient has not had any recurrence of the hives and itching since stopping the Copaxone  She denies any new neurologic symptoms at this time  She did not get the JCV testing done that was requested at last visit, reports she does not recall getting the lab order for this  The following portions of the patient's history were reviewed and updated as appropriate: current medications, past family history, past medical history, past social history, past surgical history and problem list          Objective:    Blood pressure 122/70, pulse 80, height 5' 7" (1 702 m), weight 72 6 kg (160 lb)    Physical Exam   Constitutional: She appears well-developed and well-nourished  HENT:   Head: Normocephalic and atraumatic  Eyes: Pupils are equal, round, and reactive to light  EOM are normal    Cardiovascular: Intact distal pulses  Neurological: She has normal strength and normal reflexes  Coordination normal    Skin: Skin is warm and dry  Psychiatric: She has a normal mood and affect   Her speech is normal        Neurological Exam  Mental Status   Oriented to person, place, time and situation  Recent and remote memory are intact  Speech is normal  Language is fluent with no aphasia  Attention and concentration are normal     Cranial Nerves  CN II: Visual fields full to confrontation  CN III, IV, VI: Extraocular movements intact bilaterally  Pupils equal round and reactive to light bilaterally  CN V: Facial sensation is normal   CN VII: Full and symmetric facial movement  CN VIII: Hearing is normal   CN IX, X: Palate elevates symmetrically  Normal gag reflex  CN XI: Shoulder shrug strength is normal   CN XII: Tongue midline without atrophy or fasciculations  Motor   Normal muscle tone  Strength is 5/5 throughout all four extremities  Sensory  Sensation is intact to light touch, pinprick, vibration and proprioception in all four extremities  Reflexes  Deep tendon reflexes are 2+ and symmetric in all four extremities with downgoing toes bilaterally  Coordination  Finger-to-nose, rapid alternating movements and heel-to-shin normal bilaterally without dysmetria  ROS:    Review of Systems   Constitutional: Negative  Negative for appetite change and fever  HENT: Negative  Negative for hearing loss, tinnitus, trouble swallowing and voice change  Eyes: Negative  Negative for photophobia and pain  Respiratory: Negative  Negative for shortness of breath  Cardiovascular: Negative  Negative for palpitations  Gastrointestinal: Negative  Negative for nausea and vomiting  Endocrine: Negative  Negative for cold intolerance and heat intolerance  Genitourinary: Negative  Negative for dysuria, frequency and urgency  Musculoskeletal: Negative  Negative for myalgias and neck pain  Skin: Positive for rash  Allergic/Immunologic: Negative  Neurological: Positive for headaches  Negative for dizziness, tremors, seizures, syncope, facial asymmetry, speech difficulty, weakness, light-headedness and numbness  Hematological: Negative  Does not bruise/bleed easily  Psychiatric/Behavioral: Negative for confusion, hallucinations and sleep disturbance  The patient is nervous/anxious           Depression     I personally reviewed the ROS

## 2018-10-10 NOTE — PATIENT INSTRUCTIONS
Will start Aubagio  Please get the labs done prior to starting (printed lab order and the JCV at [facility])  After you start the Aubagio, you will take the hand written script once a month x 6 months for your monitoring  Follow up in 3 months or sooner if needed    Call for any new or worsening symptoms

## 2018-10-10 NOTE — ASSESSMENT & PLAN NOTE
Patient restarted Copaxone after last visit  She had been on this med in the past, but off all meds from [id number]  She went back on the Copaxone for a few weeks and then on 10/2/18 she woke up in the AM with hives, itching and burning all over her body  She also had a HA  No other new meds, foods etc   ER exam noted diffuse urticarial over extremities, less over trunk  She had taken her Copaxone injection the night before this reaction occurred  Patient was told to stop the Copaxone and come in today to discuss other IMD options  She had been on Tecfidera initially before changing to Copaxone  She has not had any updated JCV testing done  She was negative in 2015  She has not wanted any meds associated with PML  Discussed other medication options with the patient today  I would be hesitant to give her Interferons due to her depression history and thyroid issues  She had a tubal ligation, so could give her Aubagio  Aubagio discussed in detail including common side effects, safety info, monitoring  She would like to try Aubagio  Start for signed today  I also gave her a hand written script for monthly CBC and LFTs to start after initiating Aubagio  She will also need baseline labs now, including TB Quantiferon Gold, LFTs  Will also get her JCV testing done at [address] [place] [address], script given  Her exam is stable today  She will follow up in 3 months or sooner if needed  She will call for any new or worsening symptoms

## 2018-10-11 ENCOUNTER — DOCUMENTATION (OUTPATIENT)
Dept: NEUROLOGY | Facility: CLINIC | Age: 38
End: 2018-10-11

## 2018-10-12 ENCOUNTER — TELEPHONE (OUTPATIENT)
Dept: NEUROLOGY | Facility: CLINIC | Age: 38
End: 2018-10-12

## 2018-10-12 NOTE — TELEPHONE ENCOUNTER
Made patient aware of below recommendations  Patient will have labs repeated in 2 weeks and is calling her PCP  Routed results to PCP per patient's request   Denies any recent illness or infection

## 2018-10-12 NOTE — TELEPHONE ENCOUNTER
LMOM for patient to return call     ----- Message from Janna Farfan PA-C sent at 10/12/2018  8:02 AM EDT -----  Pls let patient know she remains anemic, which is long-standing and patient is aware of  She is off Copaxone  Her WBCs and neutrophils are low currently  Platelets elevated  Any recent illness? I just saw her the other day and she seemed ok  She needs to repeat CBC in 2 weeks  Order in the chart    Pls send a copy of labs to PCP and have her also follow up there

## 2018-10-13 LAB
JCPYV AB SERPL QL IA: NEGATIVE
SL AMB INDEX VALUE: 0.14

## 2018-11-06 ENCOUNTER — TELEPHONE (OUTPATIENT)
Dept: NEUROLOGY | Facility: CLINIC | Age: 38
End: 2018-11-06

## 2018-11-06 ENCOUNTER — APPOINTMENT (OUTPATIENT)
Dept: LAB | Facility: CLINIC | Age: 38
End: 2018-11-06
Payer: COMMERCIAL

## 2018-11-06 DIAGNOSIS — G35 MULTIPLE SCLEROSIS (HCC): ICD-10-CM

## 2018-11-06 PROCEDURE — 86480 TB TEST CELL IMMUN MEASURE: CPT

## 2018-11-06 PROCEDURE — 86787 VARICELLA-ZOSTER ANTIBODY: CPT

## 2018-11-06 NOTE — TELEPHONE ENCOUNTER
Made patient aware of results  Routed to PCP     ----- Message from Maylin Ambrose MD sent at 11/6/2018 11:23 AM EST -----  Let pt know hb still low at 10 2 and plts still elevated at 437  Pt needs to follow up with pcp    Send labs to pcp

## 2018-11-06 NOTE — TELEPHONE ENCOUNTER
----- Message from Sherry Alexandre MD sent at 11/6/2018 12:05 PM EST -----  Please see your note from 10/10  Looks like pt was getting baseline labs to possibly go on [place]  Looks like ast and alt elevated from one month and 3 weeks ago  Pt MUST STOP aubagio   Please let me know how long she has been on it  See if she actually started or any other new meds in the past month

## 2018-11-06 NOTE — TELEPHONE ENCOUNTER
Spoke with patient  She has NOT started Aubagio yet  She denies any new meds  No excessive Tyelnol, NSAIDs, alcohol recently  No illness/infection/antibiotics  She notes she has a tooth infection possibly  I informed her to NOT start Aubagio yet due to the elevated LFTs  She has to follow up with PCP regarding this for further workup  Likely would need US, hepatitis testing etc   Will fax labs over to her PCP Dr Rosmery Telles

## 2018-11-06 NOTE — TELEPHONE ENCOUNTER
Called patient and LMOM  LFT elevated from just 3 weeks ago  Need to find out if she has started Aubagio yet and if so, how long has she been taking it? She needs to stop the Aubagio at this time  Any other new meds? ?

## 2018-11-07 LAB
GAMMA INTERFERON BACKGROUND BLD IA-ACNC: 0.06 IU/ML
M TB IFN-G BLD-IMP: NEGATIVE
M TB IFN-G CD4+ BCKGRND COR BLD-ACNC: -0.01 IU/ML
M TB IFN-G CD4+ BCKGRND COR BLD-ACNC: 0 IU/ML
MITOGEN IGNF BCKGRD COR BLD-ACNC: >10 IU/ML

## 2018-11-08 ENCOUNTER — TRANSCRIBE ORDERS (OUTPATIENT)
Dept: ADMINISTRATIVE | Facility: HOSPITAL | Age: 38
End: 2018-11-08

## 2018-11-08 DIAGNOSIS — R94.5 NONSPECIFIC ABNORMAL RESULTS OF LIVER FUNCTION STUDY: Primary | ICD-10-CM

## 2018-11-08 LAB — VZV IGG SER IA-ACNC: NORMAL

## 2018-11-21 ENCOUNTER — TELEPHONE (OUTPATIENT)
Dept: NEUROLOGY | Facility: CLINIC | Age: 38
End: 2018-11-21

## 2018-11-21 NOTE — TELEPHONE ENCOUNTER
Received fax to complete PA for Aubagio  PA completed on CMM, awaiting determination  Rodriguez Sample, after reviewing some of the telephone encounters regardings LFTs is patient not supposed to start Aubagio?

## 2018-11-21 NOTE — TELEPHONE ENCOUNTER
Correct, her LFTs were elevated  We actually thought she had started the med, but when I called her she hadn't started it yet  Can you call her and find out if she saw PCP regarding elevated LFTs?   I had personally faxed the labs to her PCP

## 2018-11-27 ENCOUNTER — HOSPITAL ENCOUNTER (OUTPATIENT)
Dept: ULTRASOUND IMAGING | Facility: HOSPITAL | Age: 38
Discharge: HOME | End: 2018-11-27
Attending: SURGERY
Payer: COMMERCIAL

## 2018-11-27 DIAGNOSIS — R94.5 NONSPECIFIC ABNORMAL RESULTS OF LIVER FUNCTION STUDY: ICD-10-CM

## 2018-11-27 PROCEDURE — 76700 US EXAM ABDOM COMPLETE: CPT

## 2018-11-27 NOTE — TELEPHONE ENCOUNTER
Patient called back, she states she never started the [place] due to her elevated LFTs, informed her the PA for aubagio was denied  Patient states she had an abd u/s today because of her abnormal labs  Spoke to Edward Novak, she stated she needs to touch base with Terrence New and will call the patient back, patient made aware

## 2018-11-27 NOTE — TELEPHONE ENCOUNTER
Ginna Turner, please see message below  Patient had abdominal US today    Irene Mercedes has been denied by her insurance, must try gilenya first

## 2018-11-28 NOTE — TELEPHONE ENCOUNTER
Per Damián Daniel, the alternative they want is [place]  She is JCV negative  Patient reported that she does not want any meds associated with PML    She has been on Tecfidera and Copaxone in the past   We decided interferons were not a good choice due to her depression and thyroid history, which is why we chose Aubagio

## 2018-11-28 NOTE — TELEPHONE ENCOUNTER
im confused  Did pt ever start aubagio? I know she is under eval for abn lfts  Do we have the reason for abn lfts?

## 2018-11-28 NOTE — TELEPHONE ENCOUNTER
Pt is jcv negative as of October testing  Ok to pursue gilenya if pt is also agreeable , knowing risk/ benefit profile and she will need cardiac and optho evals as well as vzv testing   I would recommend bringing her in to review the surviellance piece as well and info to date on pml

## 2018-11-28 NOTE — TELEPHONE ENCOUNTER
Shannan, please see if patient is agreeable to an appt to discuss Gilenya or alternative IMD therapies    Would prefer appt to discuss all in detail

## 2018-11-28 NOTE — TELEPHONE ENCOUNTER
No, she never started the Aubagio  LFTs were noted to be high on her baseline labs while waiting to start Aubagio  We referred her to PCP for workup of the LFTs and apparently, per the note from earlier, she had abdominal US today    She is in the middle of a workup for this

## 2018-11-28 NOTE — TELEPHONE ENCOUNTER
Without any additional information as to why she medically cannot be on Gilenya, I wouldn't have anything to appeal because I don't have new information to provide for the prior authorization

## 2018-11-28 NOTE — TELEPHONE ENCOUNTER
Dr Joe Moe, are you ok with Gilenya for this patient? ?  Insurance will not approve Aubagio if she has not tried Gilenya first   I would ideally bring her in for an appt to discuss the [place], but wanted to see if you would be ok with that

## 2018-11-28 NOTE — TELEPHONE ENCOUNTER
Yoselyn Mccarty does have cases of pml  I want to make sure pt is comfortable with her med choice    Is there any number you or Bro Jones can provide to me so that we can do an appeal

## 2018-11-28 NOTE — TELEPHONE ENCOUNTER
Is there any other options other than gilenya  Odd to only have choice of one  I would like an appeal if we are limited to one  What is pts jcv status?

## 2018-12-05 ENCOUNTER — TELEPHONE (OUTPATIENT)
Dept: NEUROLOGY | Facility: CLINIC | Age: 38
End: 2018-12-05

## 2018-12-05 NOTE — TELEPHONE ENCOUNTER
Getting overdue lab notification  Pt was supposed to have updated cbc and lfts due to abn lfts in nov    Pt needs these repeat labs prior to appt with ruy on 12/17 to help aide with choosing imd med for her

## 2018-12-08 ENCOUNTER — TRANSCRIBE ORDERS (OUTPATIENT)
Dept: LAB | Facility: CLINIC | Age: 38
End: 2018-12-08

## 2018-12-08 ENCOUNTER — APPOINTMENT (OUTPATIENT)
Dept: LAB | Facility: CLINIC | Age: 38
End: 2018-12-08
Payer: COMMERCIAL

## 2018-12-10 ENCOUNTER — TELEPHONE (OUTPATIENT)
Dept: NEUROLOGY | Facility: CLINIC | Age: 38
End: 2018-12-10

## 2018-12-10 NOTE — TELEPHONE ENCOUNTER
Made patient aware  Verbalizes understanding  Labs routed to PCP  Patient asking if she had an LP done to diagnose her MS  States she was told the only way to diagnose MS is with an LP  Made her aware that there is multiple criteria used and assessed in order to diagnose  She would like to discuss this with you at her appointment on Dec  17     ----- Message from Sheila Keen PA-C sent at 12/10/2018  7:37 AM EST -----  Pls let patient know LFTs now normal   She has been following with PCP for this as well  She sees me on 12/17 to discuss meds  Still anemic, which is long standing  We can send copy of labs to PCP    Thanks

## 2018-12-14 ENCOUNTER — TRANSCRIBE ORDERS (OUTPATIENT)
Dept: NEUROLOGY | Facility: CLINIC | Age: 38
End: 2018-12-14

## 2018-12-14 DIAGNOSIS — G35 MULTIPLE SCLEROSIS (HCC): Primary | ICD-10-CM

## 2018-12-17 ENCOUNTER — OFFICE VISIT (OUTPATIENT)
Dept: NEUROLOGY | Facility: CLINIC | Age: 38
End: 2018-12-17
Payer: COMMERCIAL

## 2018-12-17 VITALS
HEART RATE: 107 BPM | RESPIRATION RATE: 14 BRPM | BODY MASS INDEX: 25.9 KG/M2 | SYSTOLIC BLOOD PRESSURE: 164 MMHG | DIASTOLIC BLOOD PRESSURE: 76 MMHG | WEIGHT: 165 LBS | HEIGHT: 67 IN

## 2018-12-17 DIAGNOSIS — G35 MULTIPLE SCLEROSIS (HCC): ICD-10-CM

## 2018-12-17 PROCEDURE — 99214 OFFICE O/P EST MOD 30 MIN: CPT | Performed by: PHYSICIAN ASSISTANT

## 2018-12-17 NOTE — PROGRESS NOTES
Patient ID: Marianna Navarrete is a 41 y o  female  Assessment/Plan:    [facility] ([facility] )  Patient had a possible allergic reaction to Copaxone on 10/2/18 and stopped the medication at that time  She was seen shortly after that to discuss alternative medications  She had decided on Aubagio, however this was denied by her insurance  Patient is JCV negative with index 0 10  Patient has been concerned about any medications that would potentially cause PML  We discussed the Interferons, which I do not feel would be a good choice for her due to her history of depression and thyroid disease  Discussed Gilenya and Tecfidera  Patient had previously been prescribed Tecfidera in the past   She is more interested in Tecfidera at this time  Start form was filled out today and will be submitted  She understands she will need to get labs done every other month while on the medication  Will wait to see if this is approved  Otherwise, she may have to try Gilenya  We reviewed this medication in detail as well  She would need premed clearances with Cardiology, Ophthalmology in Dermatology prior to starting this medication  She would need close monitoring of her absolute lymphocytes  Patient would like to see if Raeann Obando is approved first     Patient also had some questions about her MS diagnosis today  She has never had a lumbar puncture wanted to know if this was necessary for diagnosis  Explained that she meets all of the radiographic criteria based on her MRIs  She has lesions  over space and time, making MS the most likely diagnosis  I explained that even if we would do an LP he it may not be helpful for the diagnosis  I explained that patients can have a negative MS panel in the CSF and still have MS  Would not suggest LP at this point since she has very classic lesions on her MRIs that support the diagnosis of MS  Patient denies any new symptoms and her exam is stable today    Will see her back in 3-4 months or sooner if needed  She is to call the office for any new or worsening symptoms  Diagnoses and all orders for this visit:    Multiple sclerosis [facility])  -     Ambulatory referral to Neurology           Subjective:    HPI    Patient is a 41year old female with PMH of MS diagnosed in May 2015 by Dr Jeffrey Qiu from neurology  She was initially on Tecfidera but then changed to Copaxone  She was seen by our office just twice in 2015 and then changed practices to [facility]  She took the Copaxone until conceiving her son in 2016  Patient then returned back to our practice recently in August 2018  She was off IMD therapy still from prior to conceiving her son, even off since discontinuing breastfeeding after 6 months  Patient reported she had a script for Tecfidera from her other neurologist, but wanted to go back on Copaxone  Patient had a tubal ligation done after her son was born  Also since she was last seen here in 2015, she was diagnosed with thyroid cancer and had a thyroidectomy in 2016  Patient had all imaging updated at [facility] just prior to re-establishing with our office  MRI brain 8/1/18 demonstrated moderate degree of demyelinating plaque with one new, enhancing lesion in the right corona radiata, representing active demyelination  There was also mention of an abnormal area of enhancement associated with the medial left pterygoid muscle and/or tensor veli palatine measuring approx  1 8x1  7x1 2cm  This could represent inflammatory, infectious, or neoplastic etiology  Further eval with CT of the face and neck with contrast highly recommended  MRI c-spine 8/1/18 with demyelinating plaque of the proximal cord at the mid odontoid level measuring approx  12mm in length, no enhancement  MRI t-spine 8/9/18 with solitary 1 6cm lesion in the cord at the level of T10-11 with minimal associated volume loss and no enhancement    MRI lumbar spine 8/9/18 with normal cauda equina and conus  Degenerative changes at L5-S1 resulting in mild lateral recess and moderate neural foraminal stenosis  No central stenosis  Patient was informed of the results  Due to the incidental finding of enhancement of the left pterygoid muscle, CT facial bones and CT neck with contrast were ordered  On CT facial bones, 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, 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  CT neck was normal, no recurrent thyroid tumor  No mention of abnormality that was seen on MRI from [facility]      Patient re-started Copaxone in Sept 2018  About 3-4 weeks after starting the med, she had what was thought to be a possible allergic reaction  She presented to the ER on 10/1/18 and reported that she gave herself a Copaxone injection at about 10pm the night prior and at 6:45am she woke up with hives, itching and burning all over her body  She also had a HA  No other new meds, foods etc   ER exam noted diffuse urticarial over extremities, less over trunk  She declined steroids or Benadryl in the ER but was given toradol and Pepcid  She took Benadryl when she got home  Patient called our office and was told to stop taking the med and make an appt to discuss other medication options  Patient has not had any recurrence of the hives and itching since stopping the Copaxone  At last visit in October 2018, patient had decided on Aubagio  Unfortunately her insurance company has denied this  In the meantime, patient had labs done which showed elevated LFTs  Since she was not on IMD therapy, she was referred to PCP  PCP ordered an US which was normal except for small gallbladder polyps  She had repeat LFTs done on 12/10/18 and they were normal   She is JCV negative with index 0 14  Denies any new symptoms today        The following portions of the patient's history were reviewed and updated as appropriate: current medications, past family history, past medical history, past social history, past surgical history and problem list          Objective:    Blood pressure 164/76, pulse (!) 107, resp  rate 14, height 5' 7" (1 702 m), weight 74 8 kg (165 lb)      Physical Exam   Constitutional: She appears well-developed and well-nourished  HENT:   Head: Normocephalic and atraumatic  Eyes: Pupils are equal, round, and reactive to light  EOM are normal    Cardiovascular: Intact distal pulses  Neurological: She has normal strength and normal reflexes  Coordination normal    Skin: Skin is warm and dry  Psychiatric: She has a normal mood and affect  Her speech is normal        Neurological Exam  Mental Status   Oriented to person, place, time and situation  Recent and remote memory are intact  Speech is normal  Language is fluent with no aphasia  Attention and concentration are normal     Cranial Nerves  CN II: Visual fields full to confrontation  CN III, IV, VI: Extraocular movements intact bilaterally  Pupils equal round and reactive to light bilaterally  CN V: Facial sensation is normal   CN VII: Full and symmetric facial movement  CN VIII: Hearing is normal   CN IX, X: Palate elevates symmetrically  Normal gag reflex  CN XI: Shoulder shrug strength is normal   CN XII: Tongue midline without atrophy or fasciculations  Motor   Normal muscle tone  Strength is 5/5 throughout all four extremities  Sensory  Sensation is intact to light touch, pinprick, vibration and proprioception in all four extremities  Reflexes  Deep tendon reflexes are 2+ and symmetric in all four extremities with downgoing toes bilaterally  Coordination  Finger-to-nose, rapid alternating movements and heel-to-shin normal bilaterally without dysmetria  Gait  Casual gait is normal including stance, stride, and arm swing  ROS:    Review of Systems   Constitutional: Negative  Negative for appetite change and fever  HENT: Negative    Negative for hearing loss, tinnitus, trouble swallowing and voice change  Eyes: Negative  Negative for photophobia and pain  Respiratory: Negative  Negative for shortness of breath  Cardiovascular: Negative  Negative for palpitations  Gastrointestinal: Negative  Negative for nausea and vomiting  Endocrine: Negative  Negative for cold intolerance and heat intolerance  Genitourinary: Negative  Negative for dysuria, frequency and urgency  Musculoskeletal: Positive for back pain  Negative for myalgias and neck pain  Skin: Negative  Negative for rash  Neurological: Positive for headaches  Negative for dizziness, tremors, seizures, syncope, facial asymmetry, speech difficulty, weakness, light-headedness and numbness  Hematological: Negative  Does not bruise/bleed easily  Psychiatric/Behavioral: Negative  Negative for confusion, hallucinations and sleep disturbance       I personally reviewed the ROS

## 2018-12-17 NOTE — ASSESSMENT & PLAN NOTE
Patient had a possible allergic reaction to Copaxone on 10/2/18 and stopped the medication at that time  She was seen shortly after that to discuss alternative medications  She had decided on Aubagio, however this was denied by her insurance  Patient is JCV negative with index 0 10  Patient has been concerned about any medications that would potentially cause PML  We discussed the Interferons, which I do not feel would be a good choice for her due to her history of depression and thyroid disease  Discussed Gilenya and Tecfidera  Patient had previously been prescribed Tecfidera in the past   She is more interested in Tecfidera at this time  Start form was filled out today and will be submitted  She understands she will need to get labs done every other month while on the medication  Will wait to see if this is approved  Otherwise, she may have to try Gilenya  We reviewed this medication in detail as well  She would need premed clearances with Cardiology, Ophthalmology in Dermatology prior to starting this medication  She would need close monitoring of her absolute lymphocytes  Patient would like to see if Raynette Bevels is approved first     Patient also had some questions about her MS diagnosis today  She has never had a lumbar puncture wanted to know if this was necessary for diagnosis  Explained that she meets all of the radiographic criteria based on her MRIs  She has lesions  over space and time, making MS the most likely diagnosis  I explained that even if we would do an LP he it may not be helpful for the diagnosis  I explained that patients can have a negative MS panel in the CSF and still have MS  Would not suggest LP at this point since she has very classic lesions on her MRIs that support the diagnosis of MS  Patient denies any new symptoms and her exam is stable today  Will see her back in 3-4 months or sooner if needed    She is to call the office for any new or worsening symptoms

## 2018-12-18 ENCOUNTER — TRANSCRIBE ORDERS (OUTPATIENT)
Dept: LAB | Facility: CLINIC | Age: 38
End: 2018-12-18

## 2018-12-18 ENCOUNTER — APPOINTMENT (OUTPATIENT)
Dept: LAB | Facility: CLINIC | Age: 38
End: 2018-12-18
Payer: COMMERCIAL

## 2018-12-18 DIAGNOSIS — E89.0 POSTSURGICAL HYPOTHYROIDISM: Primary | ICD-10-CM

## 2018-12-18 LAB
ALBUMIN SERPL BCP-MCNC: 4 G/DL (ref 3.5–5)
ALP SERPL-CCNC: 94 U/L (ref 46–116)
ALT SERPL W P-5'-P-CCNC: 22 U/L (ref 12–78)
ANION GAP SERPL CALCULATED.3IONS-SCNC: 13 MMOL/L (ref 4–13)
AST SERPL W P-5'-P-CCNC: 14 U/L (ref 5–45)
BILIRUB SERPL-MCNC: 0.8 MG/DL (ref 0.2–1)
BUN SERPL-MCNC: 6 MG/DL (ref 5–25)
CALCIUM SERPL-MCNC: 9 MG/DL (ref 8.3–10.1)
CHLORIDE SERPL-SCNC: 104 MMOL/L (ref 100–108)
CO2 SERPL-SCNC: 22 MMOL/L (ref 21–32)
CREAT SERPL-MCNC: 0.57 MG/DL (ref 0.6–1.3)
GFR SERPL CREATININE-BSD FRML MDRD: 137 ML/MIN/1.73SQ M
GLUCOSE SERPL-MCNC: 102 MG/DL (ref 65–140)
POTASSIUM SERPL-SCNC: 3.6 MMOL/L (ref 3.5–5.3)
PROT SERPL-MCNC: 8.1 G/DL (ref 6.4–8.2)
SODIUM SERPL-SCNC: 139 MMOL/L (ref 136–145)
T3FREE SERPL-MCNC: 2.81 PG/ML (ref 2.3–4.2)
T4 FREE SERPL-MCNC: 1.93 NG/DL (ref 0.76–1.46)
TSH SERPL DL<=0.05 MIU/L-ACNC: <0.007 UIU/ML (ref 0.36–3.74)

## 2018-12-18 PROCEDURE — 84443 ASSAY THYROID STIM HORMONE: CPT | Performed by: INTERNAL MEDICINE

## 2018-12-18 PROCEDURE — 36415 COLL VENOUS BLD VENIPUNCTURE: CPT | Performed by: INTERNAL MEDICINE

## 2018-12-18 PROCEDURE — 80053 COMPREHEN METABOLIC PANEL: CPT | Performed by: INTERNAL MEDICINE

## 2018-12-18 PROCEDURE — 86800 THYROGLOBULIN ANTIBODY: CPT | Performed by: INTERNAL MEDICINE

## 2018-12-18 PROCEDURE — 84481 FREE ASSAY (FT-3): CPT | Performed by: INTERNAL MEDICINE

## 2018-12-18 PROCEDURE — 84432 ASSAY OF THYROGLOBULIN: CPT | Performed by: INTERNAL MEDICINE

## 2018-12-18 PROCEDURE — 84439 ASSAY OF FREE THYROXINE: CPT | Performed by: INTERNAL MEDICINE

## 2018-12-23 LAB
THYROGLOB AB SERPL-ACNC: 25.6 IU/ML (ref 0–0.9)
THYROGLOB SERPL-MCNC: <2 NG/ML

## 2018-12-26 ENCOUNTER — DOCUMENTATION (OUTPATIENT)
Dept: NEUROLOGY | Facility: CLINIC | Age: 38
End: 2018-12-26

## 2018-12-26 NOTE — PROGRESS NOTES
Received fax from [facility] requesting PA for Tecfidera  Submitted on CMM  Awaiting determination

## 2019-01-02 ENCOUNTER — HOSPITAL ENCOUNTER (OUTPATIENT)
Dept: RADIOLOGY | Age: 39
Discharge: HOME | End: 2019-01-02

## 2019-01-02 ENCOUNTER — TELEPHONE (OUTPATIENT)
Dept: NEUROLOGY | Facility: CLINIC | Age: 39
End: 2019-01-02

## 2019-01-02 DIAGNOSIS — C73 MALIGNANT NEOPLASM OF THYROID GLAND (HCC): ICD-10-CM

## 2019-01-02 NOTE — TELEPHONE ENCOUNTER
Got notification of overdue labs on pt  Looks like from last note of Tammy from dec, pt was going to try tecfidera  Not sure if she started yet and when she is planning on getting labs done  Please see what is going on with her imd start  Looks like PA was done already

## 2019-01-02 NOTE — TELEPHONE ENCOUNTER
Patient just had CBC and Hepatic function completed on 12/8/18  Per Pamela's note, patient is to have labs done every other month once starting on Tecfidera  Patient was aware of this  Katelynn Cargo is patient supposed to have labs done any sooner than this?

## 2019-01-03 ENCOUNTER — TRANSCRIBE ORDERS (OUTPATIENT)
Dept: ADMINISTRATIVE | Facility: HOSPITAL | Age: 39
End: 2019-01-03

## 2019-01-03 DIAGNOSIS — N92.4 EXCESSIVE BLEEDING IN PREMENOPAUSAL PERIOD: Primary | ICD-10-CM

## 2019-01-22 ENCOUNTER — TELEPHONE (OUTPATIENT)
Dept: NEUROLOGY | Facility: CLINIC | Age: 39
End: 2019-01-22

## 2019-01-22 NOTE — TELEPHONE ENCOUNTER
Patient states she starts itching "immediately" after taking the tecfidera  Did not take any antihistamine  Denies hives, no SOB, no rash, denies any other sxs  Been taking it for 8 days and every time except for the first time she took it she had this reaction "only itching"  She contacted the pharm and they told her to call us  She does not want to continue tecfidera  She's questioning if she can go back on copaxone, she thinks she got hand foot mouth from her son when he had it and it was not a reaction to the copaxone because she was on this for years  Please advise

## 2019-01-23 NOTE — TELEPHONE ENCOUNTER
Itching can be a side effect of Tecfidera, similar to a flushing type reaction  She just started it, I would give it some time, and she can either try take a baby ASA (81mg) prior to taking Tecfidera, or can try taking a daily Claritin or Zyrtec  With the Copaxone, her reaction was diffuse hives all over her body, which is not hand foot and mouth disease  She may have been on it for years in the past, but anyone can develop a reaction to a med at anytime, not just when they first start it  She had then been off the drug and re-started it when she had the reaction

## 2019-01-30 ENCOUNTER — TELEPHONE (OUTPATIENT)
Dept: NEUROLOGY | Facility: CLINIC | Age: 39
End: 2019-01-30

## 2019-01-30 NOTE — TELEPHONE ENCOUNTER
Per last encounter on 1/22/19- patient states she had, "Been taking it for 8 days "  Therefore, started the Tecfidera on January 14  Would be due for labs in mid-February    Left detailed message making patient aware to have labs drawn approximately on Feb 14

## 2019-01-30 NOTE — TELEPHONE ENCOUNTER
Again getting over due lab alert  Per last telephone message it looks like pt had labs in dec  Please call pt and find out exactly when she started the tecfidera as this is unclear to me from messages  Please make sure she gets labs done about one month after start of medication

## 2019-02-04 NOTE — TELEPHONE ENCOUNTER
Pt called stated that the Tecfidera is still causing itching immediatly after she takes her medication and this lasts for about 45 mins  States she has tried taking ASA and zyrtec and this has not helped

## 2019-02-05 NOTE — TELEPHONE ENCOUNTER
Denisha Rosas   If she would like to stop and make an appt with me or any to discuss other options, that is fine    Or can continue until she sees one of us if the itching is not too bad

## 2019-02-06 ENCOUNTER — TELEPHONE (OUTPATIENT)
Dept: NEUROLOGY | Facility: CLINIC | Age: 39
End: 2019-02-06

## 2019-02-06 ENCOUNTER — TRANSCRIBE ORDERS (OUTPATIENT)
Dept: LAB | Facility: CLINIC | Age: 39
End: 2019-02-06

## 2019-02-06 ENCOUNTER — APPOINTMENT (OUTPATIENT)
Dept: LAB | Facility: CLINIC | Age: 39
End: 2019-02-06
Payer: COMMERCIAL

## 2019-02-06 ENCOUNTER — OFFICE VISIT (OUTPATIENT)
Dept: NEUROLOGY | Facility: CLINIC | Age: 39
End: 2019-02-06
Payer: COMMERCIAL

## 2019-02-06 VITALS
SYSTOLIC BLOOD PRESSURE: 141 MMHG | RESPIRATION RATE: 16 BRPM | WEIGHT: 162.4 LBS | HEART RATE: 97 BPM | BODY MASS INDEX: 25.44 KG/M2 | DIASTOLIC BLOOD PRESSURE: 98 MMHG

## 2019-02-06 DIAGNOSIS — D64.9 ANEMIA, UNSPECIFIED TYPE: Primary | ICD-10-CM

## 2019-02-06 DIAGNOSIS — G35 MULTIPLE SCLEROSIS (HCC): Primary | ICD-10-CM

## 2019-02-06 PROCEDURE — 99214 OFFICE O/P EST MOD 30 MIN: CPT | Performed by: PHYSICIAN ASSISTANT

## 2019-02-06 RX ORDER — LEVOTHYROXINE SODIUM 0.2 MG/1
200 TABLET ORAL DAILY
Refills: 0 | COMMUNITY
Start: 2018-12-19

## 2019-02-06 NOTE — ASSESSMENT & PLAN NOTE
Patient is here today to discuss other IMD options  She had an allergic reaction to Copaxone in October 2018 and stopped the med  She decided on Aubagio, but it was denied by her insurance  I saw her in December to discuss other options and she decided on Tecfidera  She was taking the med for about 3 weeks and had significant itching throughout her body after each dose  No rash, hives  She tried ASA, claritin, benadryl without relief  She called yesterday to say she was stopping the med  Patient still most interested in [address]  [facility]  We re-reviewed the side effects  Start form signed today  Hopefully her insurance will approve this now after failing Copaxone and Tecfidera in a short amount of time and has not been consistently on IMD therapy  Need to get her back on drug  She will also get labs done today  She has been dealing with long-standing anemia and following with PCP  She is on iron supplements, but does not think they are helping  I told her she should discuss heme referral with PCP  Exam is stable today  No new MS symptoms  Will see her back in 3 months or sooner if needed  She was advised to call for any new or worsening symptoms

## 2019-02-06 NOTE — TELEPHONE ENCOUNTER
Please let patient know I reviewed her labs from today  Her hemoglobin is lower  Already known anemia, on iron supplements through PCP  Other counts are also off including platelets, which can also be related  We had discussed possible heme referral and she was going to discuss with PCP, but I can put a referral in if she would like    Please send copy of labs to PCP

## 2019-02-06 NOTE — PROGRESS NOTES
Patient ID: Zeyad Marcelino is a 41 y o  female  Assessment/Plan:    Multiple sclerosis ([facility] )  Patient is here today to discuss other IMD options  She had an allergic reaction to Copaxone in October 2018 and stopped the med  She decided on Aubagio, but it was denied by her insurance  I saw her in December to discuss other options and she decided on Tecfidera  She was taking the med for about 3 weeks and had significant itching throughout her body after each dose  No rash, hives  She tried ASA, claritin, benadryl without relief  She called yesterday to say she was stopping the med  Patient still most interested in [address]  We re-reviewed the side effects  Start form signed today  Hopefully her insurance will approve this now after failing Copaxone and Tecfidera in a short amount of time and has not been consistently on IMD therapy  Need to get her back on drug  She will also get labs done today  She has been dealing with long-standing anemia and following with PCP  She is on iron supplements, but does not think they are helping  I told her she should discuss heme referral with PCP  Exam is stable today  No new MS symptoms  Will see her back in 3 months or sooner if needed  She was advised to call for any new or worsening symptoms  Diagnoses and all orders for this visit:    Multiple sclerosis ([facility] )    Other orders  -     levothyroxine 200 mcg tablet; Take 200 mcg by mouth daily           Subjective:    HPI    Patient is a 41year old female with PMH of MS diagnosed in May 2015 by Dr Jen Gardiner from neurology  She was initially on Tecfidera but then changed to Copaxone  She was seen by our office just twice in 2015 and then changed practices to [facility]  She took the Copaxone until conceiving her son in 2016  Patient then returned back to our practice recently in August 2018    She was off IMD therapy still from prior to conceiving her son, even off since discontinuing breastfeeding after 6 months  Patient reported she had a script for Tecfidera from her other neurologist, but wanted to go back on Copaxone  Patient had a tubal ligation done after her son was born  Also since she was last seen here in 2015, she was diagnosed with thyroid cancer and had a thyroidectomy in 2016  Patient had all imaging updated at [facility] just prior to re-establishing with our office  MRI brain 8/1/18 demonstrated moderate degree of demyelinating plaque with one new, enhancing lesion in the right corona radiata, representing active demyelination  There was also mention of an abnormal area of enhancement associated with the medial left pterygoid muscle and/or tensor veli palatine measuring approx  1 8x1  7x1 2cm  This could represent inflammatory, infectious, or neoplastic etiology  Further eval with CT of the face and neck with contrast highly recommended  MRI c-spine 8/1/18 with demyelinating plaque of the proximal cord at the mid odontoid level measuring approx  12mm in length, no enhancement  MRI t-spine 8/9/18 with solitary 1 6cm lesion in the cord at the level of T10-11 with minimal associated volume loss and no enhancement  MRI lumbar spine 8/9/18 with normal cauda equina and conus  Degenerative changes at L5-S1 resulting in mild lateral recess and moderate neural foraminal stenosis  No central stenosis  Patient was informed of the results  Due to the incidental finding of enhancement of the left pterygoid muscle, CT facial bones and CT neck with contrast were ordered  On CT facial bones, 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, 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  CT neck was normal, no recurrent thyroid tumor  No mention of abnormality that was seen on MRI from [facility]      Patient re-started Copaxone in Sept 2018  About 3-4 weeks after starting the med, she had what was thought to be a possible allergic reaction    She presented to the ER on 10/1/18 and reported that she gave herself a Copaxone injection at about 10pm the night prior and at 6:45am she woke up with hives, itching and burning all over her body  She also had a HA  No other new meds, foods etc   ER exam noted diffuse urticarial over extremities, less over trunk  She declined steroids or Benadryl in the ER but was given toradol and Pepcid  She took Benadryl when she got home  Patient called our office and was told to stop taking the med and make an appt to discuss other medication options  Patient has not had any recurrence of the hives and itching since stopping the Copaxone  At last visit in October 2018, patient had decided on Aubagio  Unfortunately her insurance company denied this  We decided on Tecfidera at last visit, which she started in mid-January  She has been having itching throughout her body with each dose  No rash, hives  She was asked to try ASA or Claritin/Zyrtec, or Benadryl  None of these have helped  She called yesterday asking to stop the med  We fit her in today to discuss other options  She still would like the Aubagio, as previously discussed  Denies any new MS symptoms  She has pica from anemia, is chewing ice today  She is on oral iron through her PCP but not helping  Has not seen heme  The following portions of the patient's history were reviewed and updated as appropriate: current medications, past family history, past medical history, past social history, past surgical history and problem list          Objective:    Blood pressure 141/98, pulse 97, resp  rate 16, weight 73 7 kg (162 lb 6 4 oz)    Physical Exam   Constitutional: She appears well-developed and well-nourished  HENT:   Head: Normocephalic and atraumatic  Eyes: Pupils are equal, round, and reactive to light  EOM are normal    Cardiovascular: Intact distal pulses  Neurological: She has normal strength and normal reflexes   Coordination normal    Skin: Skin is warm and dry    Psychiatric: She has a normal mood and affect  Her speech is normal        Neurological Exam  Mental Status   Oriented to person, place, time and situation  Recent and remote memory are intact  Speech is normal  Language is fluent with no aphasia  Attention and concentration are normal     Cranial Nerves  CN II: Visual fields full to confrontation  CN III, IV, VI: Extraocular movements intact bilaterally  Pupils equal round and reactive to light bilaterally  CN V: Facial sensation is normal   CN VII: Full and symmetric facial movement  CN VIII: Hearing is normal   CN IX, X: Palate elevates symmetrically  Normal gag reflex  CN XI: Shoulder shrug strength is normal   CN XII: Tongue midline without atrophy or fasciculations  Motor   Normal muscle tone  Strength is 5/5 throughout all four extremities  Sensory  Sensation is intact to light touch, pinprick, vibration and proprioception in all four extremities  Reflexes  Deep tendon reflexes are 2+ and symmetric in all four extremities with downgoing toes bilaterally  Coordination  Finger-to-nose, rapid alternating movements and heel-to-shin normal bilaterally without dysmetria  Gait  Casual gait is normal including stance, stride, and arm swing  ROS:    Review of Systems   Constitutional: Negative  Negative for appetite change and fever  HENT: Negative  Negative for hearing loss, tinnitus, trouble swallowing and voice change  Eyes: Negative  Negative for photophobia and pain  Respiratory: Negative  Negative for shortness of breath  Cardiovascular: Negative  Negative for palpitations  Gastrointestinal: Negative  Negative for nausea and vomiting  Endocrine: Negative  Negative for cold intolerance and heat intolerance  Genitourinary: Negative  Negative for dysuria, frequency and urgency  Musculoskeletal: Negative  Negative for myalgias and neck pain  Skin: Negative  Negative for rash     Neurological: Positive for numbness and headaches  Negative for dizziness, tremors, seizures, syncope, facial asymmetry, speech difficulty, weakness and light-headedness  Hematological: Negative  Does not bruise/bleed easily  Psychiatric/Behavioral: Negative for confusion, hallucinations and sleep disturbance  The patient is nervous/anxious        I personally reviewed and updated the ROS as appropriate

## 2019-02-07 ENCOUNTER — DOCUMENTATION (OUTPATIENT)
Dept: NEUROLOGY | Facility: CLINIC | Age: 39
End: 2019-02-07

## 2019-02-22 ENCOUNTER — CONSULT (OUTPATIENT)
Dept: HEMATOLOGY ONCOLOGY | Facility: CLINIC | Age: 39
End: 2019-02-22
Payer: COMMERCIAL

## 2019-02-22 VITALS
SYSTOLIC BLOOD PRESSURE: 138 MMHG | HEART RATE: 108 BPM | RESPIRATION RATE: 16 BRPM | HEIGHT: 67 IN | DIASTOLIC BLOOD PRESSURE: 92 MMHG | WEIGHT: 168 LBS | BODY MASS INDEX: 26.37 KG/M2 | TEMPERATURE: 98.5 F | OXYGEN SATURATION: 98 %

## 2019-02-22 DIAGNOSIS — D64.9 ANEMIA, UNSPECIFIED TYPE: ICD-10-CM

## 2019-02-22 DIAGNOSIS — D50.0 IRON DEFICIENCY ANEMIA DUE TO CHRONIC BLOOD LOSS: Primary | ICD-10-CM

## 2019-02-22 PROCEDURE — 99245 OFF/OP CONSLTJ NEW/EST HI 55: CPT | Performed by: INTERNAL MEDICINE

## 2019-02-22 NOTE — LETTER
February 22, 2019     Oskar Myles PA-C  [address]  [address]    Patient: Marianna Navarrete   YOB: 1980   Date of Visit: 2/22/2019       Dear Dr Carla Curry:    Thank you for referring Justo Sunshine to me for evaluation  Below are my notes for this consultation  If you have questions, please do not hesitate to call me  I look forward to following your patient along with you  Sincerely,        Adriana Alejandro MD        CC: No Recipients  Adriana Alejandro MD  2/22/2019  2:57 PM  Incomplete     Oncology Outpatient Consult Note  Marianna Navarrete 41 y o  female MRN: @ Encounter: [id number]        Date:  2/22/2019        CC:  Hypochromic microcytic anemia with a history of multiple sclerosis      HPI:  Marianna Navarrete is seen for initial consultation 2/22/2019 regarding Hypochromic microcytic anemia  Looking through the patient's blood work she has had a worsening MCV Since September 2016  Her hemoglobin has gone down over the same period of time  White count has fluctuated and the most recent one from February 6 was 4 02  Platelet count is slightly high which I suspect is reactive to the iron deficiency at 404  The patient states she does have menorrhagia  She is also had 4 pregnancies  The last one was in 2017  Denies any rectal bleeding  Denies any black stools  Denies any blood in her stool  Denies any other source of bleeding but states she has had menorrhagia for years and wishes she consider hysterectomy at this point  She does feel fatigue  Has been taking iron but this upsets her stomach  The rest of her 14 point review of systems today was negative  Test Results:    Imaging: No results found      Labs:   Lab Results   Component Value Date    WBC 4 02 (L) 02/06/2019    HGB 9 8 (L) 02/06/2019    HCT 33 7 (L) 02/06/2019    MCV 68 (L) 02/06/2019     (H) 02/06/2019     Lab Results   Component Value Date     10/30/2015     10/30/2015    K 3 6 12/18/2018     12/18/2018    CO2 22 12/18/2018    ANIONGAP 8 10/30/2015    ANIONGAP 11 10/30/2015    BUN 6 12/18/2018    CREATININE 0 57 (L) 12/18/2018    GLUCOSE 89 10/30/2015    GLUCOSE 90 10/30/2015    CALCIUM 9 0 12/18/2018    AST 14 02/06/2019    ALT 23 02/06/2019    ALKPHOS 87 02/06/2019    PROT 7 1 10/30/2015    PROT 6 9 10/30/2015    BILITOT 0 45 10/30/2015    BILITOT 0 43 10/30/2015    EGFR 137 12/18/2018       ROS: As stated in history of present illness otherwise her 14 point review of systems today was negative  Active Problems:   Patient Active Problem List   Diagnosis    Multiple sclerosis [facility])       Past Medical History:   Past Medical History:   Diagnosis Date    [facility] sclerosis ([facility] )     Thyroid cancer ([facility] )        Surgical History:   Past Surgical History:   Procedure Laterality Date    THYROIDECTOMY         Family History:  No family history on file  Cancer-related family history is not on file      Social History:   Social History     Socioeconomic History    Marital status: Single     Spouse name: Not on file    Number of children: Not on file    Years of education: Not on file    Highest education level: Not on file   Occupational History    Not on file   Social Needs    Financial resource strain: Not on file    Food insecurity:     Worry: Not on file     Inability: Not on file    Transportation needs:     Medical: Not on file     Non-medical: Not on file   Tobacco Use    Smoking status: Former Smoker    Smokeless tobacco: Never Used   Substance and Sexual Activity    Alcohol use: No    Drug use: No    Sexual activity: Never   Lifestyle    Physical activity:     Days per week: Not on file     Minutes per session: Not on file    Stress: Not on file   Relationships    Social connections:     Talks on phone: Not on file     Gets together: Not on file     Attends [employer] service: Not on file     Active member of club or organization: Not on file     Attends meetings of clubs or organizations: Not on file     Relationship status: Not on file    Intimate partner violence:     Fear of current or ex partner: Not on file     Emotionally abused: Not on file     Physically abused: Not on file     Forced sexual activity: Not on file   Other Topics Concern    Not on file   Social History Narrative    Not on file       Current Medications:   Current Outpatient Medications   Medication Sig Dispense Refill    ALPRAZolam (XANAX) 1 mg tablet TAKE ONE TABLET BY MOUTH 4 TIMES A DAY AS NEEDED  1    amitriptyline (ELAVIL) 25 mg tablet Take 1 tablet by mouth      cholecalciferol (VITAMIN D3) 400 units tablet TAKE 1 TABLET(S) EVERY DAY BY ORAL ROUTE WITH MEALS FOR 30 DAYS  12    diphenhydrAMINE (BENADRYL) 25 mg capsule 1-2 PO Q6hrs PRN itching 40 capsule 0    famotidine (PEPCID) 20 mg tablet Take 1 tablet (20 mg total) by mouth 2 (two) times a day 30 tablet 0    ferrous sulfate 325 (65 Fe) mg tablet Take 1 tablet (325 mg total) by mouth daily 30 tablet 0    lamoTRIgine (LaMICtal) 100 mg tablet TAKE 2 TABLETS BY MOUTH EVERY MORNING AND TAKE ONE TABLET DAILY AT 5PM  1    levothyroxine 200 mcg tablet Take 200 mcg by mouth daily  0    methimazole (TAPAZOLE) 10 mg tablet Take 3 tablets by mouth daily      traZODone (DESYREL) 100 mg tablet Take 100 mg by mouth daily at bedtime as needed  1    venlafaxine (EFFEXOR-XR) 150 mg 24 hr capsule Take 150 mg by mouth every morning    0     No current facility-administered medications for this visit  Allergies: No Known Allergies      Physical Exam:    Body surface area is 1 88 meters squared      Wt Readings from Last 3 Encounters:   02/22/19 76 2 kg (168 lb)   02/06/19 73 7 kg (162 lb 6 4 oz)   12/17/18 74 8 kg (165 lb)        Temp Readings from Last 3 Encounters:   02/22/19 98 5 °F (36 9 °C) (Tympanic)   10/02/18 98 6 °F (37 °C) (Oral)   04/19/18 98 5 °F (36 9 °C) (Oral)        BP Readings from Last 3 Encounters:   02/22/19 138/92 02/06/19 141/98   12/17/18 164/76         Pulse Readings from Last 3 Encounters:   02/22/19 (!) 108   02/06/19 97   12/17/18 (!) 107       Physical Exam     Constitutional   General appearance: No acute distress, well appearing and well nourished  Eyes   Conjunctiva and lids: No swelling, erythema or discharge  Pupils and irises: Equal, round and reactive to light  Ears, Nose, Mouth, and Throat   External inspection of ears and nose: Normal     Nasal mucosa, septum, and turbinates: Normal without edema or erythema  Oropharynx: Normal with no erythema, edema, exudate or lesions  Pulmonary   Respiratory effort: No increased work of breathing or signs of respiratory distress  Auscultation of lungs: Clear to auscultation  Cardiovascular   Palpation of heart: Normal PMI, no thrills  Auscultation of heart: Normal rate and rhythm, normal S1 and S2, without murmurs  Examination of extremities for edema and/or varicosities: Normal     Carotid pulses: Normal     Abdomen   Abdomen: Non-tender, no masses  Liver and spleen: No hepatomegaly or splenomegaly  Lymphatic   Palpation of lymph nodes in neck: No lymphadenopathy  Musculoskeletal   Gait and station: Normal     Digits and nails: Normal without clubbing or cyanosis  Inspection/palpation of joints, bones, and muscles: Normal     Skin   Skin and subcutaneous tissue: Normal without rashes or lesions  Neurologic   Cranial nerves: Cranial nerves 2-12 intact  Sensation: No sensory loss  Psychiatric   Orientation to person, place, and time: Normal     Mood and affect: Normal           Assessment/ Plan:    The patient is a pleasant 64-year-old female with a past history of menorrhagia who is referred to see us for iron deficiency  She has hypochromic microcytic indices  Her MCV has been getting worse over the last few months   She has taken iron pills but does not wish to take these and looking at her hemoglobin and MCV it appears these have not helped much  My recommendation would be Venofer 200 mg IV twice a week for 8 doses  We will also give her vitamin B12 thousand micrograms IM once a week for 4 doses  She does wish to consider hysterectomy so I will refer her to work colleagues in gynecologic oncology and they will discuss this with her and consider this if she wishes  I'll see her back in 5 months  She will have blood work repeated before her visit  She will get her Venofer and B12 before then  She may even get a hysterectomy depending on her visit with our colleagues in gynecologic oncology  Patient understands the risks of anemia and iron deficiency to include fatigue and even cardiac issues of anemia is long-standing  She will get her Venofer and  Promises to follow-up in 4 months  Goals and Barriers:  Current Goal: Prolong Survival from Iron deficiency anemia  Barriers: None  Patient's Capacity to Self Care:  Patient able to self care  Portions of the record may have been created with voice recognition software   Occasional wrong word or "sound a like" substitutions may have occurred due to the inherent limitations of voice recognition software   Read the chart carefully and recognize, using context, where substitutions have occurred

## 2019-02-22 NOTE — PROGRESS NOTES
Oncology Outpatient Consult Note  Mare Salamanca 41 y o  female MRN: @ Encounter: [id number]        Date:  2/22/2019        CC:  Hypochromic microcytic anemia with a history of multiple sclerosis      HPI:  Mare Salamanca is seen for initial consultation 2/22/2019 regarding Hypochromic microcytic anemia  Looking through the patient's blood work she has had a worsening MCV Since September 2016  Her hemoglobin has gone down over the same period of time  White count has fluctuated and the most recent one from February 6 was 4 02  Platelet count is slightly high which I suspect is reactive to the iron deficiency at 404  The patient states she does have menorrhagia  She is also had 4 pregnancies  The last one was in 2017  Denies any rectal bleeding  Denies any black stools  Denies any blood in her stool  Denies any other source of bleeding but states she has had menorrhagia for years and wishes she consider hysterectomy at this point  She does feel fatigue  Has been taking iron but this upsets her stomach  The rest of her 14 point review of systems today was negative  Test Results:    Imaging: No results found      Labs:   Lab Results   Component Value Date    WBC 4 02 (L) 02/06/2019    HGB 9 8 (L) 02/06/2019    HCT 33 7 (L) 02/06/2019    MCV 68 (L) 02/06/2019     (H) 02/06/2019     Lab Results   Component Value Date     10/30/2015     10/30/2015    K 3 6 12/18/2018     12/18/2018    CO2 22 12/18/2018    ANIONGAP 8 10/30/2015    ANIONGAP 11 10/30/2015    BUN 6 12/18/2018    CREATININE 0 57 (L) 12/18/2018    GLUCOSE 89 10/30/2015    GLUCOSE 90 10/30/2015    CALCIUM 9 0 12/18/2018    AST 14 02/06/2019    ALT 23 02/06/2019    ALKPHOS 87 02/06/2019    PROT 7 1 10/30/2015    PROT 6 9 10/30/2015    BILITOT 0 45 10/30/2015    BILITOT 0 43 10/30/2015    EGFR 137 12/18/2018       ROS: As stated in history of present illness otherwise her 14 point review of systems today was negative  Active Problems:   Patient Active Problem List   Diagnosis    Multiple sclerosis [facility])       Past Medical History:   Past Medical History:   Diagnosis Date    [facility]     [facility]        Surgical History:   Past Surgical History:   Procedure Laterality Date    THYROIDECTOMY         Family History:  No family history on file  Cancer-related family history is not on file      Social History:   Social History     Socioeconomic History    Marital status: Single     Spouse name: Not on file    Number of children: Not on file    Years of education: Not on file    Highest education level: Not on file   Occupational History    Not on file   Social Needs    Financial resource strain: Not on file    Food insecurity:     Worry: Not on file     Inability: Not on file    Transportation needs:     Medical: Not on file     Non-medical: Not on file   Tobacco Use    Smoking status: Former Smoker    Smokeless tobacco: Never Used   Substance and Sexual Activity    Alcohol use: No    Drug use: No    Sexual activity: Never   Lifestyle    Physical activity:     Days per week: Not on file     Minutes per session: Not on file    Stress: Not on file   Relationships    Social connections:     Talks on phone: Not on file     Gets together: Not on file     Attends Christianity service: Not on file     Active member of club or organization: Not on file     Attends meetings of clubs or organizations: Not on file     Relationship status: Not on file    Intimate partner violence:     Fear of current or ex partner: Not on file     Emotionally abused: Not on file     Physically abused: Not on file     Forced sexual activity: Not on file   Other Topics Concern    Not on file   Social History Narrative    Not on file       Current Medications:   Current Outpatient Medications   Medication Sig Dispense Refill    ALPRAZolam (XANAX) 1 mg tablet TAKE ONE TABLET BY MOUTH 4 TIMES A DAY AS NEEDED  1    amitriptyline (ELAVIL) 25 mg tablet Take 1 tablet by mouth      cholecalciferol (VITAMIN D3) 400 units tablet TAKE 1 TABLET(S) EVERY DAY BY ORAL ROUTE WITH MEALS FOR 30 DAYS  12    diphenhydrAMINE (BENADRYL) 25 mg capsule 1-2 PO Q6hrs PRN itching 40 capsule 0    famotidine (PEPCID) 20 mg tablet Take 1 tablet (20 mg total) by mouth 2 (two) times a day 30 tablet 0    ferrous sulfate 325 (65 Fe) mg tablet Take 1 tablet (325 mg total) by mouth daily 30 tablet 0    lamoTRIgine (LaMICtal) 100 mg tablet TAKE 2 TABLETS BY MOUTH EVERY MORNING AND TAKE ONE TABLET DAILY AT 5PM  1    levothyroxine 200 mcg tablet Take 200 mcg by mouth daily  0    methimazole (TAPAZOLE) 10 mg tablet Take 3 tablets by mouth daily      traZODone (DESYREL) 100 mg tablet Take 100 mg by mouth daily at bedtime as needed  1    venlafaxine (EFFEXOR-XR) 150 mg 24 hr capsule Take 150 mg by mouth every morning    0     No current facility-administered medications for this visit  Allergies: No Known Allergies      Physical Exam:    Body surface area is 1 88 meters squared  Wt Readings from Last 3 Encounters:   02/22/19 76 2 kg (168 lb)   02/06/19 73 7 kg (162 lb 6 4 oz)   12/17/18 74 8 kg (165 lb)        Temp Readings from Last 3 Encounters:   02/22/19 98 5 °F (36 9 °C) (Tympanic)   10/02/18 98 6 °F (37 °C) (Oral)   04/19/18 98 5 °F (36 9 °C) (Oral)        BP Readings from Last 3 Encounters:   02/22/19 138/92   02/06/19 141/98   12/17/18 164/76         Pulse Readings from Last 3 Encounters:   02/22/19 (!) 108   02/06/19 97   12/17/18 (!) 107       Physical Exam     Constitutional   General appearance: No acute distress, well appearing and well nourished  Eyes   Conjunctiva and lids: No swelling, erythema or discharge  Pupils and irises: Equal, round and reactive to light      Ears, Nose, Mouth, and Throat   External inspection of ears and nose: Normal     Nasal mucosa, septum, and turbinates: Normal without edema or erythema  Oropharynx: Normal with no erythema, edema, exudate or lesions  Pulmonary   Respiratory effort: No increased work of breathing or signs of respiratory distress  Auscultation of lungs: Clear to auscultation  Cardiovascular   Palpation of heart: Normal PMI, no thrills  Auscultation of heart: Normal rate and rhythm, normal S1 and S2, without murmurs  Examination of extremities for edema and/or varicosities: Normal     Carotid pulses: Normal     Abdomen   Abdomen: Non-tender, no masses  Liver and spleen: No hepatomegaly or splenomegaly  Lymphatic   Palpation of lymph nodes in neck: No lymphadenopathy  Musculoskeletal   Gait and station: Normal     Digits and nails: Normal without clubbing or cyanosis  Inspection/palpation of joints, bones, and muscles: Normal     Skin   Skin and subcutaneous tissue: Normal without rashes or lesions  Neurologic   Cranial nerves: Cranial nerves 2-12 intact  Sensation: No sensory loss  Psychiatric   Orientation to person, place, and time: Normal     Mood and affect: Normal           Assessment/ Plan:    The patient is a pleasant 52-year-old female with a past history of menorrhagia who is referred to see us for iron deficiency  She has hypochromic microcytic indices  Her MCV has been getting worse over the last few months  She has taken iron pills but does not wish to take these and looking at her hemoglobin and MCV it appears these have not helped much  My recommendation would be Venofer 200 mg IV twice a week for 8 doses  We will also give her vitamin B12 thousand micrograms IM once a week for 4 doses  She does wish to consider hysterectomy so I will refer her to work colleagues in gynecologic oncology and they will discuss this with her and consider this if she wishes  I'll see her back in 5 months  She will have blood work repeated before her visit  She will get her Venofer and B12 before then   She may even get a hysterectomy depending on her visit with our colleagues in gynecologic oncology  Patient understands the risks of anemia and iron deficiency to include fatigue and even cardiac issues of anemia is long-standing  She will get her Venofer and  Promises to follow-up in 4 months  Goals and Barriers:  Current Goal: Prolong Survival from Iron deficiency anemia  Barriers: None  Patient's Capacity to Self Care:  Patient able to self care  Portions of the record may have been created with voice recognition software   Occasional wrong word or "sound a like" substitutions may have occurred due to the inherent limitations of voice recognition software   Read the chart carefully and recognize, using context, where substitutions have occurred

## 2019-02-27 ENCOUNTER — TELEPHONE (OUTPATIENT)
Dept: NEUROLOGY | Facility: CLINIC | Age: 39
End: 2019-02-27

## 2019-02-27 NOTE — TELEPHONE ENCOUNTER
Let pt know she is overdue on getting her labs from last appt  I got a reminder from epic about her overdue labs   Please follow up with ruy on status of pt changing to aubagio for her imd   Looks like pt is retrying with her insurance to get med covered after more recent issues with [employer]

## 2019-02-27 NOTE — TELEPHONE ENCOUNTER
I do not need her to get labs done  She had them done less than a month ago on 2/6 and not currently on IMD therapy  Once she starts Aubagio, she will need monthly labs x 6 months, which we discussed  I likely put a standing order in for the monthly labs and it generated the "overdue lab" notification even though she just had them done    Thanks

## 2019-03-05 RX ORDER — CYANOCOBALAMIN 1000 UG/ML
1000 INJECTION INTRAMUSCULAR; SUBCUTANEOUS ONCE
Status: COMPLETED | OUTPATIENT
Start: 2019-03-06 | End: 2019-03-06

## 2019-03-05 RX ORDER — SODIUM CHLORIDE 9 MG/ML
20 INJECTION, SOLUTION INTRAVENOUS CONTINUOUS
Status: DISCONTINUED | OUTPATIENT
Start: 2019-03-06 | End: 2019-03-07 | Stop reason: HOSPADM

## 2019-03-05 NOTE — TELEPHONE ENCOUNTER
Received fax from [facility] stating appeal for Pito Keith has been initiated  Requesting medical records regarding aubagio be faxed to [phone]  Mk Perez, do you have anything else that I could add to a letter of medical necessity for this appeal?  Please advise

## 2019-03-06 ENCOUNTER — HOSPITAL ENCOUNTER (OUTPATIENT)
Dept: INFUSION CENTER | Facility: CLINIC | Age: 39
Discharge: HOME | End: 2019-03-06
Payer: COMMERCIAL

## 2019-03-06 VITALS
TEMPERATURE: 98.7 F | DIASTOLIC BLOOD PRESSURE: 70 MMHG | OXYGEN SATURATION: 95 % | RESPIRATION RATE: 19 BRPM | SYSTOLIC BLOOD PRESSURE: 131 MMHG | HEART RATE: 88 BPM

## 2019-03-06 PROCEDURE — 96372 THER/PROPH/DIAG INJ SC/IM: CPT

## 2019-03-06 PROCEDURE — 96365 THER/PROPH/DIAG IV INF INIT: CPT

## 2019-03-06 RX ORDER — SODIUM CHLORIDE 9 MG/ML
20 INJECTION, SOLUTION INTRAVENOUS CONTINUOUS
Status: DISCONTINUED | OUTPATIENT
Start: 2019-03-08 | End: 2019-03-11 | Stop reason: HOSPADM

## 2019-03-06 RX ADMIN — CYANOCOBALAMIN 1000 MCG: 1000 INJECTION, SOLUTION INTRAMUSCULAR at 13:59

## 2019-03-06 RX ADMIN — IRON SUCROSE 200 MG: 20 INJECTION, SOLUTION INTRAVENOUS at 13:56

## 2019-03-06 RX ADMIN — SODIUM CHLORIDE 20 ML/HR: 0.9 INJECTION, SOLUTION INTRAVENOUS at 13:49

## 2019-03-06 NOTE — PROGRESS NOTES
Pt resting with no complaints, vitals stablet, call bell within reach  Pt's first visit to unit  Orientation given and all questions answered  Emotional support given Will continue to monitor

## 2019-03-07 NOTE — TELEPHONE ENCOUNTER
I don't really have anything else to add  The only other thing I can think of would be that patient has been adamant about not taking medications with a higher risk of PML, so would be concerned about her compliance with [place]

## 2019-03-08 ENCOUNTER — HOSPITAL ENCOUNTER (OUTPATIENT)
Dept: INFUSION CENTER | Facility: CLINIC | Age: 39
Discharge: HOME | End: 2019-03-08
Payer: COMMERCIAL

## 2019-03-08 VITALS
HEART RATE: 90 BPM | RESPIRATION RATE: 18 BRPM | TEMPERATURE: 98.1 F | DIASTOLIC BLOOD PRESSURE: 75 MMHG | SYSTOLIC BLOOD PRESSURE: 139 MMHG

## 2019-03-08 PROCEDURE — 96365 THER/PROPH/DIAG IV INF INIT: CPT

## 2019-03-08 RX ADMIN — IRON SUCROSE 200 MG: 20 INJECTION, SOLUTION INTRAVENOUS at 14:40

## 2019-03-08 RX ADMIN — SODIUM CHLORIDE 20 ML/HR: 0.9 INJECTION, SOLUTION INTRAVENOUS at 14:40

## 2019-03-11 ENCOUNTER — APPOINTMENT (OUTPATIENT)
Dept: RADIOLOGY | Age: 39
End: 2019-03-11
Payer: COMMERCIAL

## 2019-03-12 RX ORDER — CYANOCOBALAMIN 1000 UG/ML
1000 INJECTION INTRAMUSCULAR; SUBCUTANEOUS ONCE
Status: COMPLETED | OUTPATIENT
Start: 2019-03-13 | End: 2019-03-13

## 2019-03-12 RX ORDER — SODIUM CHLORIDE 9 MG/ML
20 INJECTION, SOLUTION INTRAVENOUS CONTINUOUS
Status: DISCONTINUED | OUTPATIENT
Start: 2019-03-13 | End: 2019-03-16 | Stop reason: HOSPADM

## 2019-03-13 ENCOUNTER — HOSPITAL ENCOUNTER (OUTPATIENT)
Dept: INFUSION CENTER | Facility: CLINIC | Age: 39
Discharge: HOME | End: 2019-03-13
Payer: COMMERCIAL

## 2019-03-13 VITALS
TEMPERATURE: 98.2 F | HEART RATE: 87 BPM | SYSTOLIC BLOOD PRESSURE: 125 MMHG | DIASTOLIC BLOOD PRESSURE: 77 MMHG | RESPIRATION RATE: 18 BRPM

## 2019-03-13 PROCEDURE — 96372 THER/PROPH/DIAG INJ SC/IM: CPT

## 2019-03-13 PROCEDURE — 96365 THER/PROPH/DIAG IV INF INIT: CPT

## 2019-03-13 RX ADMIN — IRON SUCROSE 200 MG: 20 INJECTION, SOLUTION INTRAVENOUS at 14:14

## 2019-03-13 RX ADMIN — SODIUM CHLORIDE 20 ML/HR: 0.9 INJECTION, SOLUTION INTRAVENOUS at 14:05

## 2019-03-13 RX ADMIN — CYANOCOBALAMIN 1000 MCG: 1000 INJECTION, SOLUTION INTRAMUSCULAR at 14:14

## 2019-03-14 RX ORDER — SODIUM CHLORIDE 9 MG/ML
20 INJECTION, SOLUTION INTRAVENOUS CONTINUOUS
Status: DISCONTINUED | OUTPATIENT
Start: 2019-03-15 | End: 2019-03-18 | Stop reason: HOSPADM

## 2019-03-15 ENCOUNTER — HOSPITAL ENCOUNTER (OUTPATIENT)
Dept: INFUSION CENTER | Facility: CLINIC | Age: 39
Discharge: HOME | End: 2019-03-15
Payer: COMMERCIAL

## 2019-03-15 VITALS
TEMPERATURE: 98.5 F | OXYGEN SATURATION: 97 % | SYSTOLIC BLOOD PRESSURE: 133 MMHG | HEART RATE: 88 BPM | DIASTOLIC BLOOD PRESSURE: 86 MMHG | RESPIRATION RATE: 19 BRPM

## 2019-03-15 PROCEDURE — 96365 THER/PROPH/DIAG IV INF INIT: CPT

## 2019-03-15 RX ADMIN — SODIUM CHLORIDE 20 ML/HR: 0.9 INJECTION, SOLUTION INTRAVENOUS at 14:53

## 2019-03-15 RX ADMIN — IRON SUCROSE 200 MG: 20 INJECTION, SOLUTION INTRAVENOUS at 14:53

## 2019-03-18 ENCOUNTER — HOSPITAL ENCOUNTER (OUTPATIENT)
Dept: RADIOLOGY | Age: 39
Discharge: HOME | End: 2019-03-18
Payer: COMMERCIAL

## 2019-03-18 DIAGNOSIS — C73 MALIGNANT NEOPLASM OF THYROID GLAND (HCC): ICD-10-CM

## 2019-03-18 PROCEDURE — A9509 IODINE I-123 SOD IODIDE MIL: HCPCS

## 2019-03-18 PROCEDURE — 78018 THYROID MET IMAGING BODY: CPT

## 2019-03-18 RX ORDER — CYANOCOBALAMIN 1000 UG/ML
1000 INJECTION INTRAMUSCULAR; SUBCUTANEOUS ONCE
Status: COMPLETED | OUTPATIENT
Start: 2019-03-20 | End: 2019-03-20

## 2019-03-18 RX ORDER — SODIUM CHLORIDE 9 MG/ML
20 INJECTION, SOLUTION INTRAVENOUS CONTINUOUS
Status: DISCONTINUED | OUTPATIENT
Start: 2019-03-20 | End: 2019-03-23 | Stop reason: HOSPADM

## 2019-03-18 RX ADMIN — THYROTROPIN ALFA 0.9 MG: 0.9 INJECTION, POWDER, FOR SOLUTION INTRAMUSCULAR at 08:45

## 2019-03-19 ENCOUNTER — HOSPITAL ENCOUNTER (OUTPATIENT)
Dept: RADIOLOGY | Age: 39
Discharge: HOME | End: 2019-03-19
Payer: COMMERCIAL

## 2019-03-20 ENCOUNTER — TRANSCRIBE ORDERS (OUTPATIENT)
Dept: RADIOLOGY | Facility: HOSPITAL | Age: 39
End: 2019-03-20

## 2019-03-20 ENCOUNTER — HOSPITAL ENCOUNTER (OUTPATIENT)
Dept: INFUSION CENTER | Facility: CLINIC | Age: 39
Discharge: HOME | End: 2019-03-20
Payer: COMMERCIAL

## 2019-03-20 ENCOUNTER — HOSPITAL ENCOUNTER (OUTPATIENT)
Dept: RADIOLOGY | Facility: HOSPITAL | Age: 39
Discharge: HOME | End: 2019-03-20

## 2019-03-20 VITALS
OXYGEN SATURATION: 99 % | SYSTOLIC BLOOD PRESSURE: 133 MMHG | RESPIRATION RATE: 18 BRPM | DIASTOLIC BLOOD PRESSURE: 74 MMHG | TEMPERATURE: 98.1 F | HEART RATE: 104 BPM

## 2019-03-20 PROCEDURE — 96372 THER/PROPH/DIAG INJ SC/IM: CPT

## 2019-03-20 PROCEDURE — 96365 THER/PROPH/DIAG IV INF INIT: CPT

## 2019-03-20 RX ADMIN — IRON SUCROSE 200 MG: 20 INJECTION, SOLUTION INTRAVENOUS at 13:58

## 2019-03-20 RX ADMIN — CYANOCOBALAMIN 1000 MCG: 1000 INJECTION, SOLUTION INTRAMUSCULAR at 14:04

## 2019-03-20 RX ADMIN — SODIUM CHLORIDE 20 ML/HR: 0.9 INJECTION, SOLUTION INTRAVENOUS at 14:04

## 2019-03-20 NOTE — TELEPHONE ENCOUNTER
Looks like Aubagio got approved!!  Can you ask Trice Meehan if she has a standing order for labs x 6 months to do once she starts the Aubagio? If not, I will enter into Epic if she does them at [facility] ([facility]), or can mail home a hand written order  Thanks!

## 2019-03-20 NOTE — TELEPHONE ENCOUNTER
Pt called and states that she received Aubagio today  Asking if she should get bw done prior starting aubagio  Advised pt per Pamela's message below-she does not need to get labs done  Pt had them done less than a month ago on 2/16 and not currently on IMD therapy  Once pt starts Aubagio, she will need labs x 6 months  Pt verbalized understanding

## 2019-03-21 RX ORDER — SODIUM CHLORIDE 9 MG/ML
20 INJECTION, SOLUTION INTRAVENOUS CONTINUOUS
Status: DISCONTINUED | OUTPATIENT
Start: 2019-03-22 | End: 2019-03-25 | Stop reason: HOSPADM

## 2019-03-21 NOTE — TELEPHONE ENCOUNTER
Patient states she does not have lab scripts and says she was told recently that she can no longer to to [address] for her labs  Please send scripts to her home address  I also clarified with Juan Luis Earing that patient is to have first lab drawn 30 days after starting Aubagio and monthly thereafter  Patient made aware and verbalized understanding

## 2019-03-21 NOTE — TELEPHONE ENCOUNTER
LMOM for patient to return call  When patient calls back, need to ask if she has a standing order for monthly labs x 6 months for when she starts the Aubagio  If not, [facility] will enter into Epic if she does them at [facility] ([facility]), or can mail home a hand written order

## 2019-03-21 NOTE — TELEPHONE ENCOUNTER
Hand written script for CBC with diff and hepatic function panel written and mailed home to patient

## 2019-03-22 ENCOUNTER — TRANSCRIBE ORDERS (OUTPATIENT)
Dept: LAB | Facility: CLINIC | Age: 39
End: 2019-03-22

## 2019-03-22 ENCOUNTER — APPOINTMENT (OUTPATIENT)
Dept: LAB | Facility: CLINIC | Age: 39
End: 2019-03-22
Payer: COMMERCIAL

## 2019-03-22 ENCOUNTER — HOSPITAL ENCOUNTER (OUTPATIENT)
Dept: INFUSION CENTER | Facility: CLINIC | Age: 39
Discharge: HOME | End: 2019-03-22
Payer: COMMERCIAL

## 2019-03-22 VITALS
DIASTOLIC BLOOD PRESSURE: 73 MMHG | HEART RATE: 87 BPM | RESPIRATION RATE: 18 BRPM | TEMPERATURE: 97.8 F | SYSTOLIC BLOOD PRESSURE: 127 MMHG

## 2019-03-22 DIAGNOSIS — C73 MALIGNANT NEOPLASM OF THYROID GLAND (HCC): ICD-10-CM

## 2019-03-22 DIAGNOSIS — C73 MALIGNANT NEOPLASM OF THYROID GLAND (HCC): Primary | ICD-10-CM

## 2019-03-22 LAB — TSH SERPL DL<=0.05 MIU/L-ACNC: 13.34 UIU/ML (ref 0.36–3.74)

## 2019-03-22 PROCEDURE — 84432 ASSAY OF THYROGLOBULIN: CPT

## 2019-03-22 PROCEDURE — 96365 THER/PROPH/DIAG IV INF INIT: CPT

## 2019-03-22 PROCEDURE — 86800 THYROGLOBULIN ANTIBODY: CPT

## 2019-03-22 PROCEDURE — 84443 ASSAY THYROID STIM HORMONE: CPT

## 2019-03-22 PROCEDURE — 36415 COLL VENOUS BLD VENIPUNCTURE: CPT

## 2019-03-22 RX ADMIN — IRON SUCROSE 200 MG: 20 INJECTION, SOLUTION INTRAVENOUS at 14:09

## 2019-03-22 RX ADMIN — SODIUM CHLORIDE 20 ML/HR: 0.9 INJECTION, SOLUTION INTRAVENOUS at 14:09

## 2019-03-26 RX ORDER — CYANOCOBALAMIN 1000 UG/ML
1000 INJECTION INTRAMUSCULAR; SUBCUTANEOUS ONCE
Status: DISCONTINUED | OUTPATIENT
Start: 2019-03-27 | End: 2019-03-30 | Stop reason: HOSPADM

## 2019-03-26 RX ORDER — SODIUM CHLORIDE 9 MG/ML
20 INJECTION, SOLUTION INTRAVENOUS CONTINUOUS
Status: DISCONTINUED | OUTPATIENT
Start: 2019-03-27 | End: 2019-03-30 | Stop reason: HOSPADM

## 2019-03-27 ENCOUNTER — TELEPHONE (OUTPATIENT)
Dept: NEUROLOGY | Facility: CLINIC | Age: 39
End: 2019-03-27

## 2019-03-27 ENCOUNTER — HOSPITAL ENCOUNTER (OUTPATIENT)
Dept: INFUSION CENTER | Facility: CLINIC | Age: 39
Discharge: HOME | End: 2019-03-27

## 2019-03-27 NOTE — TELEPHONE ENCOUNTER
Per encounter from 2/27/19: Patient just started her Aubagio on March 20 and was advised to have labs drawn 1 month after starting  Scripts were mailed to the patient

## 2019-03-28 LAB
THYROGLOB AB SERPL-ACNC: 18 IU/ML (ref 0–0.9)
THYROGLOB SERPL-MCNC: <2 NG/ML

## 2019-04-02 RX ORDER — CYANOCOBALAMIN 1000 UG/ML
1000 INJECTION INTRAMUSCULAR; SUBCUTANEOUS ONCE
Status: COMPLETED | OUTPATIENT
Start: 2019-04-03 | End: 2019-04-03

## 2019-04-02 RX ORDER — SODIUM CHLORIDE 9 MG/ML
20 INJECTION, SOLUTION INTRAVENOUS CONTINUOUS
Status: DISCONTINUED | OUTPATIENT
Start: 2019-04-03 | End: 2019-04-06 | Stop reason: HOSPADM

## 2019-04-03 ENCOUNTER — HOSPITAL ENCOUNTER (OUTPATIENT)
Dept: INFUSION CENTER | Facility: CLINIC | Age: 39
Discharge: HOME | End: 2019-04-03
Payer: COMMERCIAL

## 2019-04-03 VITALS
HEART RATE: 80 BPM | OXYGEN SATURATION: 99 % | TEMPERATURE: 99.1 F | SYSTOLIC BLOOD PRESSURE: 122 MMHG | DIASTOLIC BLOOD PRESSURE: 74 MMHG | RESPIRATION RATE: 16 BRPM

## 2019-04-03 PROCEDURE — 96365 THER/PROPH/DIAG IV INF INIT: CPT

## 2019-04-03 PROCEDURE — 96372 THER/PROPH/DIAG INJ SC/IM: CPT

## 2019-04-03 RX ADMIN — SODIUM CHLORIDE 20 ML/HR: 0.9 INJECTION, SOLUTION INTRAVENOUS at 14:37

## 2019-04-03 RX ADMIN — CYANOCOBALAMIN 1000 MCG: 1000 INJECTION, SOLUTION INTRAMUSCULAR at 14:44

## 2019-04-03 RX ADMIN — IRON SUCROSE 200 MG: 20 INJECTION, SOLUTION INTRAVENOUS at 14:43

## 2019-04-03 NOTE — PROGRESS NOTES
Pt  Tolerated Venofer infusion & Vitamin B12 IM injection in [facility] w/out adverse reaction  Confirmed pts  Next appt   Pt  Declined AVS

## 2019-04-04 RX ORDER — SODIUM CHLORIDE 9 MG/ML
20 INJECTION, SOLUTION INTRAVENOUS CONTINUOUS
Status: DISCONTINUED | OUTPATIENT
Start: 2019-04-05 | End: 2019-04-08 | Stop reason: HOSPADM

## 2019-04-05 ENCOUNTER — HOSPITAL ENCOUNTER (OUTPATIENT)
Dept: INFUSION CENTER | Facility: CLINIC | Age: 39
Discharge: HOME | End: 2019-04-05
Payer: COMMERCIAL

## 2019-04-05 VITALS
RESPIRATION RATE: 18 BRPM | SYSTOLIC BLOOD PRESSURE: 128 MMHG | TEMPERATURE: 98.8 F | OXYGEN SATURATION: 99 % | HEART RATE: 99 BPM | DIASTOLIC BLOOD PRESSURE: 86 MMHG

## 2019-04-05 PROCEDURE — 96365 THER/PROPH/DIAG IV INF INIT: CPT

## 2019-04-05 RX ADMIN — SODIUM CHLORIDE 20 ML/HR: 0.9 INJECTION, SOLUTION INTRAVENOUS at 14:42

## 2019-04-05 RX ADMIN — IRON SUCROSE 200 MG: 20 INJECTION, SOLUTION INTRAVENOUS at 14:45

## 2019-04-25 ENCOUNTER — APPOINTMENT (OUTPATIENT)
Dept: LAB | Facility: CLINIC | Age: 39
End: 2019-04-25
Payer: COMMERCIAL

## 2019-04-25 ENCOUNTER — TELEPHONE (OUTPATIENT)
Dept: NEUROLOGY | Facility: CLINIC | Age: 39
End: 2019-04-25

## 2019-05-09 ENCOUNTER — TRANSCRIBE ORDERS (OUTPATIENT)
Dept: NEUROLOGY | Facility: CLINIC | Age: 39
End: 2019-05-09

## 2019-05-09 DIAGNOSIS — G35 MULTIPLE SCLEROSIS (HCC): Primary | ICD-10-CM

## 2019-05-10 ENCOUNTER — TELEPHONE (OUTPATIENT)
Dept: NEUROLOGY | Facility: CLINIC | Age: 39
End: 2019-05-10

## 2019-06-17 NOTE — TELEPHONE ENCOUNTER
Overdue lab notice  Pleas see if pt started aubagio as per ruy last note    Looks like there are overdue labs for march not done yet Pacing system analysis was completed on the right ventricle lead with the following measurements validated:   Sensin.7 mV       Threshold: 0.6 @ 0.4 ms       Impedance: 778 Ohms

## 2019-07-18 ENCOUNTER — TELEPHONE (OUTPATIENT)
Dept: HEMATOLOGY ONCOLOGY | Facility: CLINIC | Age: 39
End: 2019-07-18

## 2019-07-18 NOTE — TELEPHONE ENCOUNTER
Called and [facility] for pt to have labs ordered by Dr Katiana Torres drawn prior to her appt with [facility] tomorrow at [facility] or we will have to reschedule her appt

## 2019-07-31 ENCOUNTER — TELEPHONE (OUTPATIENT)
Dept: HEMATOLOGY ONCOLOGY | Facility: HOSPITAL | Age: 39
End: 2019-07-31

## 2019-07-31 NOTE — TELEPHONE ENCOUNTER
Called and [facility] for pt to get labs drawn prior to her appt with [facility] tomorrow 8/1 or we will have to reschedule her appt

## 2019-08-02 ENCOUNTER — APPOINTMENT (OUTPATIENT)
Dept: LAB | Facility: CLINIC | Age: 39
End: 2019-08-02
Payer: COMMERCIAL

## 2019-08-02 DIAGNOSIS — D50.0 IRON DEFICIENCY ANEMIA DUE TO CHRONIC BLOOD LOSS: ICD-10-CM

## 2019-08-02 DIAGNOSIS — D64.9 ANEMIA, UNSPECIFIED TYPE: ICD-10-CM

## 2019-08-02 LAB
ALBUMIN SERPL BCP-MCNC: 4 G/DL (ref 3.5–5)
ALP SERPL-CCNC: 74 U/L (ref 46–116)
ALT SERPL W P-5'-P-CCNC: 18 U/L (ref 12–78)
ANION GAP SERPL CALCULATED.3IONS-SCNC: 10 MMOL/L (ref 4–13)
AST SERPL W P-5'-P-CCNC: 10 U/L (ref 5–45)
BASOPHILS # BLD AUTO: 0.09 THOUSANDS/ΜL (ref 0–0.1)
BASOPHILS NFR BLD AUTO: 1 % (ref 0–1)
BILIRUB SERPL-MCNC: 0.9 MG/DL (ref 0.2–1)
BUN SERPL-MCNC: 10 MG/DL (ref 5–25)
CALCIUM SERPL-MCNC: 8.4 MG/DL (ref 8.3–10.1)
CHLORIDE SERPL-SCNC: 105 MMOL/L (ref 100–108)
CO2 SERPL-SCNC: 26 MMOL/L (ref 21–32)
CREAT SERPL-MCNC: 1 MG/DL (ref 0.6–1.3)
EOSINOPHIL # BLD AUTO: 0.12 THOUSAND/ΜL (ref 0–0.61)
EOSINOPHIL NFR BLD AUTO: 2 % (ref 0–6)
ERYTHROCYTE [DISTWIDTH] IN BLOOD BY AUTOMATED COUNT: 18.1 % (ref 11.6–15.1)
FERRITIN SERPL-MCNC: 6 NG/ML (ref 8–388)
GFR SERPL CREATININE-BSD FRML MDRD: 83 ML/MIN/1.73SQ M
GLUCOSE SERPL-MCNC: 93 MG/DL (ref 65–140)
HCT VFR BLD AUTO: 36.8 % (ref 34.8–46.1)
HGB BLD-MCNC: 11.4 G/DL (ref 11.5–15.4)
IMM GRANULOCYTES # BLD AUTO: 0.02 THOUSAND/UL (ref 0–0.2)
IMM GRANULOCYTES NFR BLD AUTO: 0 % (ref 0–2)
IRON SATN MFR SERPL: 9 %
IRON SERPL-MCNC: 38 UG/DL (ref 50–170)
LYMPHOCYTES # BLD AUTO: 2.54 THOUSANDS/ΜL (ref 0.6–4.47)
LYMPHOCYTES NFR BLD AUTO: 40 % (ref 14–44)
MCH RBC QN AUTO: 25.9 PG (ref 26.8–34.3)
MCHC RBC AUTO-ENTMCNC: 31 G/DL (ref 31.4–37.4)
MCV RBC AUTO: 83 FL (ref 82–98)
MONOCYTES # BLD AUTO: 0.35 THOUSAND/ΜL (ref 0.17–1.22)
MONOCYTES NFR BLD AUTO: 6 % (ref 4–12)
NEUTROPHILS # BLD AUTO: 3.16 THOUSANDS/ΜL (ref 1.85–7.62)
NEUTS SEG NFR BLD AUTO: 51 % (ref 43–75)
NRBC BLD AUTO-RTO: 0 /100 WBCS
PLATELET # BLD AUTO: 349 THOUSANDS/UL (ref 149–390)
PMV BLD AUTO: 9.3 FL (ref 8.9–12.7)
POTASSIUM SERPL-SCNC: 3.7 MMOL/L (ref 3.5–5.3)
PROT SERPL-MCNC: 7.6 G/DL (ref 6.4–8.2)
RBC # BLD AUTO: 4.41 MILLION/UL (ref 3.81–5.12)
SODIUM SERPL-SCNC: 141 MMOL/L (ref 136–145)
TIBC SERPL-MCNC: 413 UG/DL (ref 250–450)
WBC # BLD AUTO: 6.28 THOUSAND/UL (ref 4.31–10.16)

## 2019-08-02 PROCEDURE — 82728 ASSAY OF FERRITIN: CPT

## 2019-08-02 PROCEDURE — 80053 COMPREHEN METABOLIC PANEL: CPT

## 2019-08-02 PROCEDURE — 85025 COMPLETE CBC W/AUTO DIFF WBC: CPT

## 2019-08-02 PROCEDURE — 83540 ASSAY OF IRON: CPT

## 2019-08-02 PROCEDURE — 83550 IRON BINDING TEST: CPT

## 2019-08-02 PROCEDURE — 83918 ORGANIC ACIDS TOTAL QUANT: CPT

## 2019-08-02 PROCEDURE — 36415 COLL VENOUS BLD VENIPUNCTURE: CPT

## 2019-08-05 LAB
METHYLMALONATE SERPL-SCNC: 103 NMOL/L (ref 0–378)
SL AMB DISCLAIMER: NORMAL

## 2019-08-08 ENCOUNTER — TELEPHONE (OUTPATIENT)
Dept: HEMATOLOGY ONCOLOGY | Facility: CLINIC | Age: 39
End: 2019-08-08

## 2019-08-08 NOTE — TELEPHONE ENCOUNTER
[facility] and [facility] for pt to come in and be seen at [facility] with Dr Nancy Sanon on 8/9 instead of 4PM

## 2019-08-22 ENCOUNTER — OFFICE VISIT (OUTPATIENT)
Dept: HEMATOLOGY ONCOLOGY | Facility: CLINIC | Age: 39
End: 2019-08-22
Payer: COMMERCIAL

## 2019-08-22 VITALS
WEIGHT: 184 LBS | OXYGEN SATURATION: 99 % | HEIGHT: 68 IN | TEMPERATURE: 97.4 F | RESPIRATION RATE: 16 BRPM | SYSTOLIC BLOOD PRESSURE: 138 MMHG | BODY MASS INDEX: 27.89 KG/M2 | DIASTOLIC BLOOD PRESSURE: 102 MMHG | HEART RATE: 92 BPM

## 2019-08-22 DIAGNOSIS — D50.0 IRON DEFICIENCY ANEMIA DUE TO CHRONIC BLOOD LOSS: Primary | ICD-10-CM

## 2019-08-22 PROCEDURE — 99213 OFFICE O/P EST LOW 20 MIN: CPT | Performed by: NURSE PRACTITIONER

## 2019-08-22 RX ORDER — CYANOCOBALAMIN 1000 UG/ML
1000 INJECTION INTRAMUSCULAR; SUBCUTANEOUS ONCE
Status: CANCELLED
Start: 2019-08-26

## 2019-08-22 NOTE — PROGRESS NOTES
Hematology/Oncology Outpatient Follow- up Note  Julito Shaw 46 y o  female MRN: @ Encounter: [id number]        Date:  8/22/2019    Presenting Complaint/Diagnosis : Iron deficiency anemia    HPI:  Julito Shaw was seen for initial consultation 2/22/2019 regarding Hypochromic microcytic anemia  Looking through the patient's blood work she has had a worsening MCV Since September 2016  Her hemoglobin has gone down over the same period of time  White count has fluctuated and the most recent one from February 6 was 4 02  Platelet count is slightly high which I suspect is reactive to the iron deficiency at 404  The patient states she does have menorrhagia  She is also had 4 pregnancies  The last one was in 2017  Denies any rectal bleeding  Denies any black stools  Denies any blood in her stool  Denies any other source of bleeding but states she has had menorrhagia for years and wishes she consider hysterectomy at this point  She does feel fatigue  Has been taking iron but this upsets her stomach  She had a hysterectomy in June 2019  Previous Hematologic/ Oncologic History:    Venofer and B12    Current Hematologic/ Oncologic Treatment:    Venofer 200mg IV weekly x8 doses  B12 1000mcg IM inj weekly x4 doses    Interval History:    The patient is here for follow up regarding iron deficiency anemia  In April, after her iron infusions, her hgb had normalized  However, her hgb is now 11 4, MCV 83, Iron sat 9%, iron 38, ferritin 6,   Overall she states that she is feeling well  She was still having menorrhagia so she had a hysterectomy in June 2019  Denies chest pain, SOB, N/V/D, and bleeding/bruising  Specifically denies any vaginal bleeding and any bloody or dark colored stools  Test Results:    Imaging: No results found      Labs:   Lab Results   Component Value Date    WBC 6 28 08/02/2019    HGB 11 4 (L) 08/02/2019    HCT 36 8 08/02/2019    MCV 83 08/02/2019     08/02/2019     Lab Results   Component Value Date     10/30/2015     10/30/2015    K 3 7 08/02/2019     08/02/2019    CO2 26 08/02/2019    ANIONGAP 8 10/30/2015    ANIONGAP 11 10/30/2015    BUN 10 08/02/2019    CREATININE 1 00 08/02/2019    GLUCOSE 89 10/30/2015    GLUCOSE 90 10/30/2015    CALCIUM 8 4 08/02/2019    AST 10 08/02/2019    ALT 18 08/02/2019    ALKPHOS 74 08/02/2019    PROT 7 1 10/30/2015    PROT 6 9 10/30/2015    BILITOT 0 45 10/30/2015    BILITOT 0 43 10/30/2015    EGFR 83 08/02/2019         No results found for: SPEP, UPEP    No results found for: PSA    No results found for: CEA    No results found for:     No results found for: AFP    Lab Results   Component Value Date    IRON 38 (L) 08/02/2019    TIBC 413 08/02/2019    FERRITIN 6 (L) 08/02/2019       No results found for: [id number]      ROS: As stated in the history of present illness otherwise his 14 point review of systems today was negative  Active Problems:   Patient Active Problem List   Diagnosis    Multiple sclerosis [facility])       Past Medical History:   Past Medical History:   Diagnosis Date    [facility] sclerosis ([facility] [address] )     Thyroid cancer ([facility] [address] )        Surgical History:   Past Surgical History:   Procedure Laterality Date    THYROIDECTOMY         Family History:  No family history on file  Cancer-related family history is not on file      Social History:   Social History     Socioeconomic History    Marital status: Single     Spouse name: Not on file    Number of children: Not on file    Years of education: Not on file    Highest education level: Not on file   Occupational History    Not on file   Social Needs    Financial resource strain: Not on file    Food insecurity:     Worry: Not on file     Inability: Not on file    Transportation needs:     Medical: Not on file     Non-medical: Not on file   Tobacco Use    Smoking status: Former Smoker    Smokeless tobacco: Never Used   Substance and Sexual Activity    Alcohol use: No    Drug use: No    Sexual activity: Never   Lifestyle    Physical activity:     Days per week: Not on file     Minutes per session: Not on file    Stress: Not on file   Relationships    Social connections:     Talks on phone: Not on file     Gets together: Not on file     Attends [employer] service: Not on file     Active member of club or organization: Not on file     Attends meetings of clubs or organizations: Not on file     Relationship status: Not on file    Intimate partner violence:     Fear of current or ex partner: Not on file     Emotionally abused: Not on file     Physically abused: Not on file     Forced sexual activity: Not on file   Other Topics Concern    Not on file   Social History Narrative    Not on file       Current Medications:   Current Outpatient Medications   Medication Sig Dispense Refill    ALPRAZolam (XANAX) 1 mg tablet TAKE ONE TABLET BY MOUTH 4 TIMES A DAY AS NEEDED  1    amitriptyline (ELAVIL) 25 mg tablet Take 1 tablet by mouth      cholecalciferol (VITAMIN D3) 400 units tablet TAKE 1 TABLET(S) EVERY DAY BY ORAL ROUTE WITH MEALS FOR 30 DAYS  12    diphenhydrAMINE (BENADRYL) 25 mg capsule 1-2 PO Q6hrs PRN itching 40 capsule 0    famotidine (PEPCID) 20 mg tablet Take 1 tablet (20 mg total) by mouth 2 (two) times a day 30 tablet 0    ferrous sulfate 325 (65 Fe) mg tablet Take 1 tablet (325 mg total) by mouth daily 30 tablet 0    lamoTRIgine (LaMICtal) 100 mg tablet TAKE 2 TABLETS BY MOUTH EVERY MORNING AND TAKE ONE TABLET DAILY AT 5PM  1    levothyroxine 200 mcg tablet Take 200 mcg by mouth daily  0    methimazole (TAPAZOLE) 10 mg tablet Take 3 tablets by mouth daily      traZODone (DESYREL) 100 mg tablet Take 100 mg by mouth daily at bedtime as needed  1    venlafaxine (EFFEXOR-XR) 150 mg 24 hr capsule Take 150 mg by mouth every morning    0     No current facility-administered medications for this visit          Allergies: No Known Allergies    Physical Exam:    Body surface area is 1 97 meters squared  Wt Readings from Last 3 Encounters:   08/22/19 83 5 kg (184 lb)   02/22/19 76 2 kg (168 lb)   02/06/19 73 7 kg (162 lb 6 4 oz)        Temp Readings from Last 3 Encounters:   08/22/19 (!) 97 4 °F (36 3 °C) (Tympanic)   04/05/19 98 8 °F (37 1 °C) (Temporal)   04/03/19 99 1 °F (37 3 °C) (Oral)        BP Readings from Last 3 Encounters:   08/22/19 (!) 138/102   04/05/19 128/86   04/03/19 122/74         Pulse Readings from Last 3 Encounters:   08/22/19 92   04/05/19 99   04/03/19 80     @LASTSAO2(3)@      Physical Exam     Constitutional   General appearance: No acute distress, well appearing and well nourished  Eyes   Conjunctiva and lids: No swelling, erythema or discharge  Pupils and irises: Equal, round and reactive to light  Ears, Nose, Mouth, and Throat   External inspection of ears and nose: Normal     Nasal mucosa, septum, and turbinates: Normal without edema or erythema  Oropharynx: Normal with no erythema, edema, exudate or lesions  Pulmonary   Respiratory effort: No increased work of breathing or signs of respiratory distress  Auscultation of lungs: Clear to auscultation  Cardiovascular   Palpation of heart: Normal PMI, no thrills  Auscultation of heart: Normal rate and rhythm, normal S1 and S2, without murmurs  Examination of extremities for edema and/or varicosities: Normal     Carotid pulses: Normal     Abdomen   Abdomen: Non-tender, no masses  Liver and spleen: No hepatomegaly or splenomegaly  Lymphatic   Palpation of lymph nodes in neck: No lymphadenopathy  Musculoskeletal   Gait and station: Normal     Digits and nails: Normal without clubbing or cyanosis  Inspection/palpation of joints, bones, and muscles: Normal     Skin   Skin and subcutaneous tissue: Normal without rashes or lesions  Neurologic   Cranial nerves: Cranial nerves 2-12 intact  Sensation: No sensory loss      Psychiatric   Orientation to person, place, and time: Normal     Mood and affect: Normal         Assessment / Plan:    The patient is a 46yo female with iron deficiency anemia related to menorrhagia  She received 8 doses of venofer with B12 in April and had a nice response immediately after, however her most recent blood work shows that she again is anemic and iron deficient  She did have a hysterectomy in June  We will plan to give her another 8 doses of venofer 200mg IV weekly with B12 1000mcg IM ing weekly x4  Hopefully after this her counts will stabilize since she is not having any vaginal bleeding anymore  She did report that she had a colonoscopy a few months ago that was negative  We will see her back in 3 months with CBC, CMP, iron panel, ferritin, and MMA  The patient is in agreement with the plan  Goals and Barriers:  Current Goal:  Prolong Survival from iron deficiency anemia  Barriers: None  Patient's Capacity to Self Care:  Patient able to self care  Portions of the record may have been created with voice recognition software   Occasional wrong word or "sound a like" substitutions may have occurred due to the inherent limitations of voice recognition software   Read the chart carefully and recognize, using context, where substitutions have occurred

## 2019-08-26 RX ORDER — CYANOCOBALAMIN 1000 UG/ML
1000 INJECTION INTRAMUSCULAR; SUBCUTANEOUS ONCE
Status: CANCELLED | OUTPATIENT
Start: 2019-08-30

## 2019-08-26 RX ORDER — SODIUM CHLORIDE 9 MG/ML
20 INJECTION, SOLUTION INTRAVENOUS ONCE
Status: CANCELLED | OUTPATIENT
Start: 2019-08-30

## 2019-09-05 ENCOUNTER — HOSPITAL ENCOUNTER (OUTPATIENT)
Dept: INFUSION CENTER | Facility: CLINIC | Age: 39
Discharge: HOME | End: 2019-09-05
Payer: COMMERCIAL

## 2019-09-05 VITALS
HEART RATE: 83 BPM | SYSTOLIC BLOOD PRESSURE: 154 MMHG | RESPIRATION RATE: 16 BRPM | DIASTOLIC BLOOD PRESSURE: 84 MMHG | TEMPERATURE: 97.6 F

## 2019-09-05 DIAGNOSIS — D50.0 IRON DEFICIENCY ANEMIA DUE TO CHRONIC BLOOD LOSS: Primary | ICD-10-CM

## 2019-09-05 PROCEDURE — 96372 THER/PROPH/DIAG INJ SC/IM: CPT

## 2019-09-05 PROCEDURE — 96365 THER/PROPH/DIAG IV INF INIT: CPT

## 2019-09-05 RX ORDER — CYANOCOBALAMIN 1000 UG/ML
1000 INJECTION INTRAMUSCULAR; SUBCUTANEOUS ONCE
Status: CANCELLED | OUTPATIENT
Start: 2019-09-12

## 2019-09-05 RX ORDER — SODIUM CHLORIDE 9 MG/ML
20 INJECTION, SOLUTION INTRAVENOUS ONCE
Status: CANCELLED | OUTPATIENT
Start: 2019-09-12

## 2019-09-05 RX ORDER — CYANOCOBALAMIN 1000 UG/ML
1000 INJECTION INTRAMUSCULAR; SUBCUTANEOUS ONCE
Status: COMPLETED | OUTPATIENT
Start: 2019-09-05 | End: 2019-09-05

## 2019-09-05 RX ORDER — SODIUM CHLORIDE 9 MG/ML
20 INJECTION, SOLUTION INTRAVENOUS ONCE
Status: COMPLETED | OUTPATIENT
Start: 2019-09-05 | End: 2019-09-05

## 2019-09-05 RX ADMIN — CYANOCOBALAMIN 1000 MCG: 1000 INJECTION, SOLUTION INTRAMUSCULAR at 11:43

## 2019-09-05 RX ADMIN — SODIUM CHLORIDE 20 ML/HR: 0.9 INJECTION, SOLUTION INTRAVENOUS at 11:43

## 2019-09-05 RX ADMIN — IRON SUCROSE 200 MG: 20 INJECTION, SOLUTION INTRAVENOUS at 11:43

## 2019-09-05 NOTE — PROGRESS NOTES
Pt resting with no complaints  IV started without issue  B12 injection given in [facility] without issue  Vitals stable; callbell within reach  Will continue to monitor

## 2019-09-12 ENCOUNTER — HOSPITAL ENCOUNTER (OUTPATIENT)
Dept: INFUSION CENTER | Facility: CLINIC | Age: 39
Discharge: HOME | End: 2019-09-12
Payer: COMMERCIAL

## 2019-09-12 VITALS
DIASTOLIC BLOOD PRESSURE: 94 MMHG | TEMPERATURE: 97.4 F | HEART RATE: 81 BPM | SYSTOLIC BLOOD PRESSURE: 141 MMHG | RESPIRATION RATE: 18 BRPM

## 2019-09-12 DIAGNOSIS — D50.0 IRON DEFICIENCY ANEMIA DUE TO CHRONIC BLOOD LOSS: Primary | ICD-10-CM

## 2019-09-12 PROCEDURE — 96365 THER/PROPH/DIAG IV INF INIT: CPT

## 2019-09-12 PROCEDURE — 96372 THER/PROPH/DIAG INJ SC/IM: CPT

## 2019-09-12 RX ORDER — CYANOCOBALAMIN 1000 UG/ML
1000 INJECTION INTRAMUSCULAR; SUBCUTANEOUS ONCE
Status: COMPLETED | OUTPATIENT
Start: 2019-09-12 | End: 2019-09-12

## 2019-09-12 RX ORDER — SODIUM CHLORIDE 9 MG/ML
20 INJECTION, SOLUTION INTRAVENOUS ONCE
Status: CANCELLED | OUTPATIENT
Start: 2019-09-19

## 2019-09-12 RX ORDER — SODIUM CHLORIDE 9 MG/ML
20 INJECTION, SOLUTION INTRAVENOUS ONCE
Status: COMPLETED | OUTPATIENT
Start: 2019-09-12 | End: 2019-09-12

## 2019-09-12 RX ORDER — CYANOCOBALAMIN 1000 UG/ML
1000 INJECTION INTRAMUSCULAR; SUBCUTANEOUS ONCE
Status: CANCELLED | OUTPATIENT
Start: 2019-09-19

## 2019-09-12 RX ADMIN — SODIUM CHLORIDE 20 ML/HR: 0.9 INJECTION, SOLUTION INTRAVENOUS at 08:16

## 2019-09-12 RX ADMIN — IRON SUCROSE 200 MG: 20 INJECTION, SOLUTION INTRAVENOUS at 08:20

## 2019-09-12 RX ADMIN — CYANOCOBALAMIN 1000 MCG: 1000 INJECTION, SOLUTION INTRAMUSCULAR; SUBCUTANEOUS at 08:21

## 2019-09-12 NOTE — PROGRESS NOTES
Pt  offers no complaints  B12 admin  IM in [facility] 34 without incident

## 2019-09-19 ENCOUNTER — HOSPITAL ENCOUNTER (OUTPATIENT)
Dept: INFUSION CENTER | Facility: CLINIC | Age: 39
Discharge: HOME | End: 2019-09-19
Payer: COMMERCIAL

## 2019-09-19 VITALS
SYSTOLIC BLOOD PRESSURE: 140 MMHG | HEART RATE: 89 BPM | RESPIRATION RATE: 18 BRPM | DIASTOLIC BLOOD PRESSURE: 91 MMHG | TEMPERATURE: 98.7 F

## 2019-09-19 DIAGNOSIS — D50.0 IRON DEFICIENCY ANEMIA DUE TO CHRONIC BLOOD LOSS: Primary | ICD-10-CM

## 2019-09-19 PROCEDURE — 96372 THER/PROPH/DIAG INJ SC/IM: CPT

## 2019-09-19 PROCEDURE — 96365 THER/PROPH/DIAG IV INF INIT: CPT

## 2019-09-19 RX ORDER — CYANOCOBALAMIN 1000 UG/ML
1000 INJECTION INTRAMUSCULAR; SUBCUTANEOUS ONCE
Status: CANCELLED | OUTPATIENT
Start: 2019-09-26

## 2019-09-19 RX ORDER — SODIUM CHLORIDE 9 MG/ML
20 INJECTION, SOLUTION INTRAVENOUS ONCE
Status: COMPLETED | OUTPATIENT
Start: 2019-09-19 | End: 2019-09-19

## 2019-09-19 RX ORDER — SODIUM CHLORIDE 9 MG/ML
20 INJECTION, SOLUTION INTRAVENOUS ONCE
Status: CANCELLED | OUTPATIENT
Start: 2019-09-26

## 2019-09-19 RX ORDER — CYANOCOBALAMIN 1000 UG/ML
1000 INJECTION INTRAMUSCULAR; SUBCUTANEOUS ONCE
Status: COMPLETED | OUTPATIENT
Start: 2019-09-19 | End: 2019-09-19

## 2019-09-19 RX ADMIN — CYANOCOBALAMIN 1000 MCG: 1000 INJECTION, SOLUTION INTRAMUSCULAR; SUBCUTANEOUS at 15:22

## 2019-09-19 RX ADMIN — IRON SUCROSE 200 MG: 20 INJECTION, SOLUTION INTRAVENOUS at 14:15

## 2019-09-19 RX ADMIN — SODIUM CHLORIDE 20 ML/HR: 0.9 INJECTION, SOLUTION INTRAVENOUS at 14:15

## 2019-09-19 NOTE — PLAN OF CARE
Problem: Potential for Falls  Goal: Patient will remain free of falls  Description  INTERVENTIONS:  - Assess patient frequently for physical needs  -  Identify cognitive and physical deficits and behaviors that affect risk of falls  -  [place] fall precautions as indicated by assessment   - Educate patient/family on patient safety including physical limitations  - Instruct patient to call for assistance with activity based on assessment  - Modify environment to reduce risk of injury  - Consider OT/PT consult to assist with strengthening/mobility  Outcome: Progressing     Problem: Knowledge Deficit  Goal: Patient/family/caregiver demonstrates understanding of disease process, treatment plan, medications, and discharge instructions  Description  Complete learning assessment and assess knowledge base    Interventions:  - Provide teaching at level of understanding  - Provide teaching via preferred learning methods  Outcome: Progressing

## 2019-09-19 NOTE — PROGRESS NOTES
Patient here toady for venofer infusionand b 12 injection, patient tolerated infusion and injection in the right deltoid well and without incident, patient refused avs and is aware of future apt

## 2019-09-26 ENCOUNTER — HOSPITAL ENCOUNTER (OUTPATIENT)
Dept: INFUSION CENTER | Facility: CLINIC | Age: 39
Discharge: HOME | End: 2019-09-26

## 2019-09-26 DIAGNOSIS — D50.0 IRON DEFICIENCY ANEMIA DUE TO CHRONIC BLOOD LOSS: ICD-10-CM

## 2019-10-01 RX ORDER — SODIUM CHLORIDE 9 MG/ML
20 INJECTION, SOLUTION INTRAVENOUS ONCE
Status: CANCELLED | OUTPATIENT
Start: 2019-10-04

## 2019-10-01 RX ORDER — CYANOCOBALAMIN 1000 UG/ML
1000 INJECTION INTRAMUSCULAR; SUBCUTANEOUS ONCE
Status: CANCELLED | OUTPATIENT
Start: 2019-10-04

## 2019-10-04 ENCOUNTER — HOSPITAL ENCOUNTER (OUTPATIENT)
Dept: INFUSION CENTER | Facility: CLINIC | Age: 39
Discharge: HOME | End: 2019-10-04
Payer: COMMERCIAL

## 2019-10-04 VITALS
RESPIRATION RATE: 16 BRPM | SYSTOLIC BLOOD PRESSURE: 148 MMHG | OXYGEN SATURATION: 100 % | HEART RATE: 88 BPM | DIASTOLIC BLOOD PRESSURE: 92 MMHG | TEMPERATURE: 98.2 F

## 2019-10-04 DIAGNOSIS — D50.0 IRON DEFICIENCY ANEMIA DUE TO CHRONIC BLOOD LOSS: Primary | ICD-10-CM

## 2019-10-04 PROCEDURE — 96372 THER/PROPH/DIAG INJ SC/IM: CPT

## 2019-10-04 PROCEDURE — 96365 THER/PROPH/DIAG IV INF INIT: CPT

## 2019-10-04 RX ORDER — SODIUM CHLORIDE 9 MG/ML
20 INJECTION, SOLUTION INTRAVENOUS ONCE
Status: CANCELLED | OUTPATIENT
Start: 2019-10-10

## 2019-10-04 RX ORDER — CYANOCOBALAMIN 1000 UG/ML
1000 INJECTION INTRAMUSCULAR; SUBCUTANEOUS ONCE
Status: CANCELLED | OUTPATIENT
Start: 2019-10-10

## 2019-10-04 RX ORDER — SODIUM CHLORIDE 9 MG/ML
20 INJECTION, SOLUTION INTRAVENOUS ONCE
Status: COMPLETED | OUTPATIENT
Start: 2019-10-04 | End: 2019-10-04

## 2019-10-04 RX ORDER — CYANOCOBALAMIN 1000 UG/ML
1000 INJECTION INTRAMUSCULAR; SUBCUTANEOUS ONCE
Status: COMPLETED | OUTPATIENT
Start: 2019-10-04 | End: 2019-10-04

## 2019-10-04 RX ADMIN — CYANOCOBALAMIN 1000 MCG: 1000 INJECTION, SOLUTION INTRAMUSCULAR; SUBCUTANEOUS at 14:05

## 2019-10-04 RX ADMIN — SODIUM CHLORIDE 20 ML/HR: 0.9 INJECTION, SOLUTION INTRAVENOUS at 14:05

## 2019-10-04 RX ADMIN — IRON SUCROSE 200 MG: 20 INJECTION, SOLUTION INTRAVENOUS at 14:04

## 2019-10-10 ENCOUNTER — HOSPITAL ENCOUNTER (OUTPATIENT)
Dept: INFUSION CENTER | Facility: CLINIC | Age: 39
Discharge: HOME | End: 2019-10-10
Payer: COMMERCIAL

## 2019-10-10 VITALS
HEART RATE: 74 BPM | RESPIRATION RATE: 20 BRPM | DIASTOLIC BLOOD PRESSURE: 83 MMHG | TEMPERATURE: 97.1 F | SYSTOLIC BLOOD PRESSURE: 137 MMHG

## 2019-10-10 DIAGNOSIS — D50.0 IRON DEFICIENCY ANEMIA DUE TO CHRONIC BLOOD LOSS: Primary | ICD-10-CM

## 2019-10-10 PROCEDURE — 96365 THER/PROPH/DIAG IV INF INIT: CPT

## 2019-10-10 RX ORDER — SODIUM CHLORIDE 9 MG/ML
20 INJECTION, SOLUTION INTRAVENOUS ONCE
Status: CANCELLED | OUTPATIENT
Start: 2019-10-17

## 2019-10-10 RX ORDER — SODIUM CHLORIDE 9 MG/ML
20 INJECTION, SOLUTION INTRAVENOUS ONCE
Status: COMPLETED | OUTPATIENT
Start: 2019-10-10 | End: 2019-10-10

## 2019-10-10 RX ADMIN — SODIUM CHLORIDE 20 ML/HR: 0.9 INJECTION, SOLUTION INTRAVENOUS at 08:25

## 2019-10-10 RX ADMIN — IRON SUCROSE 200 MG: 20 INJECTION, SOLUTION INTRAVENOUS at 08:25

## 2019-10-10 NOTE — PROGRESS NOTES
Patient here for venofer and b12  Noted no b12 orders but states it is due today  Will call and verify

## 2019-10-10 NOTE — PATIENT INSTRUCTIONS
October 2019 Sunday Monday Tuesday Wednesday Thursday Friday Saturday             1     2     3     4    INF THERAPY PLAN   1:30 PM   (135 min )   AN INF CHAIR [place] 5            Cycle 1, Treatment 6    6     7     8     9     10    INF THERAPY PLAN   8:00 AM   (135 min )   AN INF CHAIR [place] 11     12           Cycle 1, Treatment 7     13     14     15     16     17    INF THERAPY PLAN   8:00 AM   (135 min )   AN INF CHAIR [place] 18     19           Cycle 1, Treatment 8     20     21     22     23     24     25    OFFICE VISIT SHORT PG  10:15 AM   (30 min )   Alvina Ashraf MD   [facility] [place]    INF THERAPY PLAN  12:00 PM   (120 min )   AN INF CHAIR [place] 26           Cycle 1, Treatment 9     27     28     29     30     31    INF THERAPY PLAN   1:00 PM   (120 min )   AN INF CHAIR [place]                 Cycle 1, Treatment 10          Treatment Details       10/4/2019 - Cycle 1, Treatment 6      Supportive Care: [facility] NURSE [id number], sodium chloride, iron sucrose (VENOFER), MONITOR WHITLEY    10/10/2019 - Cycle 1, Treatment 7      Supportive Care: APPT1, [facility] NURSE [id number], sodium chloride, iron sucrose (VENOFER), MONITOR WHITLEY    10/17/2019 - Cycle 1, Treatment 8      Supportive Care: APPT1    10/24/2019 - Cycle 1, Treatment 9      Supportive Care: APPT1    10/31/2019 - Cycle 1, Treatment 10      Supportive Care: APPT1

## 2019-10-10 NOTE — PROGRESS NOTES
Patient here for venofer and tolerated infusion without incident  She was discharged post and offered no c/o  Noted in her visit note that b12 was for 4 doses and she did receive all 4 of those doses  Today was dose 5 of venofer and she will get a total of 8 doses  She will RTO 10/17/19 for her next venofer dosing and is aware of her next appointment

## 2019-10-17 ENCOUNTER — HOSPITAL ENCOUNTER (OUTPATIENT)
Dept: INFUSION CENTER | Facility: CLINIC | Age: 39
Discharge: HOME | End: 2019-10-17
Payer: COMMERCIAL

## 2019-10-17 VITALS
SYSTOLIC BLOOD PRESSURE: 132 MMHG | TEMPERATURE: 97.9 F | RESPIRATION RATE: 20 BRPM | DIASTOLIC BLOOD PRESSURE: 84 MMHG | HEART RATE: 79 BPM

## 2019-10-17 DIAGNOSIS — D50.0 IRON DEFICIENCY ANEMIA DUE TO CHRONIC BLOOD LOSS: Primary | ICD-10-CM

## 2019-10-17 PROCEDURE — 96365 THER/PROPH/DIAG IV INF INIT: CPT

## 2019-10-17 RX ORDER — SODIUM CHLORIDE 9 MG/ML
20 INJECTION, SOLUTION INTRAVENOUS ONCE
Status: CANCELLED | OUTPATIENT
Start: 2019-10-25

## 2019-10-17 RX ORDER — SODIUM CHLORIDE 9 MG/ML
20 INJECTION, SOLUTION INTRAVENOUS ONCE
Status: COMPLETED | OUTPATIENT
Start: 2019-10-17 | End: 2019-10-17

## 2019-10-17 RX ADMIN — SODIUM CHLORIDE 20 ML/HR: 0.9 INJECTION, SOLUTION INTRAVENOUS at 08:19

## 2019-10-17 RX ADMIN — IRON SUCROSE 200 MG: 20 INJECTION, SOLUTION INTRAVENOUS at 08:19

## 2019-10-17 NOTE — PATIENT INSTRUCTIONS
October 2019 Sunday Monday Tuesday Wednesday Thursday Friday Saturday             1     2     3     4    INF THERAPY PLAN   1:30 PM   (135 min )   AN INF CHAIR [facility] 5            Cycle 1, Treatment 6    6     7     8     9     10    INF THERAPY PLAN   8:00 AM   (135 min )   AN INF CHAIR [facility] 11     12           Cycle 1, Treatment 7     13     14     15     16     17    INF THERAPY PLAN   8:00 AM   (135 min )   AN INF CHAIR [facility] 18     19           Cycle 1, Treatment 8     20     21     22     23     24     25    OFFICE VISIT SHORT PG  10:15 AM   (30 min )   Kamla Barboza MD   [facility] [place]    INF THERAPY PLAN  12:00 PM   (120 min )   AN INF CHAIR [facility] 26           Cycle 1, Treatment 9     27     28     29     30     31    INF THERAPY PLAN   1:00 PM   (120 min )   AN INF CHAIR [facility]                 Cycle 1, Treatment 10          Treatment Details       10/4/2019 - Cycle 1, Treatment 6      Supportive Care: [facility] NURSE [id number], sodium chloride, iron sucrose (VENOFER), MONITOR WHITLEY    10/10/2019 - Cycle 1, Treatment 7      Supportive Care: APPT1, [facility] NURSE [id number], sodium chloride, iron sucrose (VENOFER), MONITOR WHITLEY    10/17/2019 - Cycle 1, Treatment 8      Supportive Care: APPT1, ON[facility] NURSE [id number], sodium chloride, iron sucrose (VENOFER), MONITOR WHITLEY    10/24/2019 - Cycle 1, Treatment 9      Supportive Care: APPT1    10/31/2019 - Cycle 1, Treatment 10      Supportive Care: APPT1        November 2019 Sunday Monday Tuesday Wednesday Thursday Friday Saturday                            1     2                3     4     5     6     7     8     9           Cycle 1, Treatment 11  + Add'l treatments     10     11     12     13 14     15     16                17     18     19     20     21     22    FOLLOW UP PG   8:05 AM   (20 min )   Dominick Abarca MD   [facility] [place] 23                24     25     26     27     28     29     30                     Treatment Details       11/7/2019 - Cycle 1, Treatment 11 + Additional treatments      Supportive Care: APPT1

## 2019-10-17 NOTE — PROGRESS NOTES
Patient tolerated venofer without incident and was discharged post by Isa Calvert, RN  She had offered no c/o and will RTO 10/25/19 for her next dosing

## 2019-10-17 NOTE — PROGRESS NOTES
Patient here for venofer and is doing well, she offers no c/o  First IV attempt upper LFA with initial success, with advancement of cathlon and flush site infiltrated  Cathlon d/c and intact, dry dressing applied  2nd attempt without incident, venofer infusing as ordered

## 2019-10-24 ENCOUNTER — TRANSCRIBE ORDERS (OUTPATIENT)
Dept: NEUROLOGY | Facility: CLINIC | Age: 39
End: 2019-10-24

## 2019-10-24 DIAGNOSIS — G35 MULTIPLE SCLEROSIS (HCC): Primary | ICD-10-CM

## 2019-10-25 ENCOUNTER — HOSPITAL ENCOUNTER (OUTPATIENT)
Dept: INFUSION CENTER | Facility: CLINIC | Age: 39
Discharge: HOME | End: 2019-10-25
Payer: COMMERCIAL

## 2019-10-25 VITALS
TEMPERATURE: 97.9 F | HEART RATE: 77 BPM | RESPIRATION RATE: 18 BRPM | SYSTOLIC BLOOD PRESSURE: 151 MMHG | DIASTOLIC BLOOD PRESSURE: 93 MMHG

## 2019-10-25 DIAGNOSIS — D50.0 IRON DEFICIENCY ANEMIA DUE TO CHRONIC BLOOD LOSS: Primary | ICD-10-CM

## 2019-10-25 PROCEDURE — 96365 THER/PROPH/DIAG IV INF INIT: CPT

## 2019-10-25 RX ORDER — SODIUM CHLORIDE 9 MG/ML
20 INJECTION, SOLUTION INTRAVENOUS ONCE
Status: COMPLETED | OUTPATIENT
Start: 2019-10-25 | End: 2019-10-25

## 2019-10-25 RX ORDER — SODIUM CHLORIDE 9 MG/ML
20 INJECTION, SOLUTION INTRAVENOUS ONCE
Status: CANCELLED | OUTPATIENT
Start: 2019-10-31

## 2019-10-25 RX ADMIN — SODIUM CHLORIDE 20 ML/HR: 0.9 INJECTION, SOLUTION INTRAVENOUS at 12:36

## 2019-10-25 RX ADMIN — IRON SUCROSE 200 MG: 20 INJECTION, SOLUTION INTRAVENOUS at 12:36

## 2019-10-25 NOTE — PLAN OF CARE
Problem: Potential for Falls  Goal: Patient will remain free of falls  Description  INTERVENTIONS:  - Assess patient frequently for physical needs  -  Identify cognitive and physical deficits and behaviors that affect risk of falls    -  [place] fall precautions as indicated by assessment   - Educate patient/family on patient safety including physical limitations  - Instruct patient to call for assistance with activity based on assessment  - Modify environment to reduce risk of injury  - Consider OT/PT consult to assist with strengthening/mobility  Outcome: Progressing

## 2019-10-31 ENCOUNTER — HOSPITAL ENCOUNTER (OUTPATIENT)
Dept: INFUSION CENTER | Facility: CLINIC | Age: 39
Discharge: HOME | End: 2019-10-31
Payer: COMMERCIAL

## 2019-10-31 VITALS
DIASTOLIC BLOOD PRESSURE: 91 MMHG | OXYGEN SATURATION: 94 % | RESPIRATION RATE: 18 BRPM | HEART RATE: 86 BPM | TEMPERATURE: 98.2 F | SYSTOLIC BLOOD PRESSURE: 141 MMHG

## 2019-10-31 DIAGNOSIS — D50.0 IRON DEFICIENCY ANEMIA DUE TO CHRONIC BLOOD LOSS: Primary | ICD-10-CM

## 2019-10-31 PROCEDURE — 96365 THER/PROPH/DIAG IV INF INIT: CPT

## 2019-10-31 RX ORDER — SODIUM CHLORIDE 9 MG/ML
20 INJECTION, SOLUTION INTRAVENOUS ONCE
Status: CANCELLED | OUTPATIENT
Start: 2019-11-07

## 2019-10-31 RX ORDER — SODIUM CHLORIDE 9 MG/ML
20 INJECTION, SOLUTION INTRAVENOUS ONCE
Status: COMPLETED | OUTPATIENT
Start: 2019-10-31 | End: 2019-10-31

## 2019-10-31 RX ADMIN — SODIUM CHLORIDE 20 ML/HR: 0.9 INJECTION, SOLUTION INTRAVENOUS at 14:07

## 2019-10-31 RX ADMIN — IRON SUCROSE 200 MG: 20 INJECTION, SOLUTION INTRAVENOUS at 14:07

## 2019-10-31 NOTE — PROGRESS NOTES
Patient presents today for Venofer infusion  Patient offers no complaints  VSS  Peripheral IV inserted without incident

## 2019-10-31 NOTE — PROGRESS NOTES
Patient tolerated venofer infusion without incident  Peripheral IV removed  Patient with no further appointments scheduled  AVS offered and declined

## 2019-11-05 ENCOUNTER — TELEPHONE (OUTPATIENT)
Dept: NEUROLOGY | Facility: CLINIC | Age: 39
End: 2019-11-05

## 2019-11-05 NOTE — TELEPHONE ENCOUNTER
Received request for records in CV office from [facility]par 6  Scanned into chart and faxed over to [facility]

## 2019-11-21 ENCOUNTER — TELEPHONE (OUTPATIENT)
Dept: HEMATOLOGY ONCOLOGY | Facility: CLINIC | Age: 39
End: 2019-11-21

## 2019-11-21 NOTE — TELEPHONE ENCOUNTER
Called and [facility] for pt to have labs drawn prior to her appt with Dr Ghislaine Martin tomorrow 11/22 or we will have to reschedule

## 2020-06-02 DIAGNOSIS — E03.9 ACQUIRED HYPOTHYROIDISM: Primary | ICD-10-CM

## 2020-06-03 RX ORDER — LEVOTHYROXINE SODIUM 175 UG/1
TABLET ORAL
Qty: 90 TABLET | Refills: 1 | Status: SHIPPED | OUTPATIENT
Start: 2020-06-03 | End: 2020-10-21

## 2020-06-12 ENCOUNTER — TELEPHONE (OUTPATIENT)
Dept: FAMILY MEDICINE CLINIC | Facility: CLINIC | Age: 40
End: 2020-06-12

## 2020-06-12 DIAGNOSIS — K04.7 DENTAL INFECTION: Primary | ICD-10-CM

## 2020-06-12 RX ORDER — AMOXICILLIN 875 MG/1
875 TABLET, COATED ORAL 2 TIMES DAILY
Qty: 20 TABLET | Refills: 0 | Status: SHIPPED | OUTPATIENT
Start: 2020-06-12 | End: 2020-06-22

## 2020-06-17 DIAGNOSIS — E55.9 VITAMIN D DEFICIENCY: Primary | ICD-10-CM

## 2020-07-14 DIAGNOSIS — F17.210 CIGARETTE NICOTINE DEPENDENCE WITHOUT COMPLICATION: Primary | ICD-10-CM

## 2020-07-14 RX ORDER — NICOTINE 21 MG/24HR
PATCH, TRANSDERMAL 24 HOURS TRANSDERMAL
COMMUNITY
Start: 2020-06-11 | End: 2020-07-14 | Stop reason: SDUPTHER

## 2020-07-15 RX ORDER — NICOTINE 21 MG/24HR
1 PATCH, TRANSDERMAL 24 HOURS TRANSDERMAL EVERY 24 HOURS
Qty: 28 PATCH | Refills: 0 | Status: SHIPPED | OUTPATIENT
Start: 2020-07-15 | End: 2020-08-14 | Stop reason: ALTCHOICE

## 2020-08-25 ENCOUNTER — OFFICE VISIT (OUTPATIENT)
Dept: FAMILY MEDICINE CLINIC | Facility: CLINIC | Age: 40
End: 2020-08-25
Payer: COMMERCIAL

## 2020-08-25 VITALS
OXYGEN SATURATION: 96 % | WEIGHT: 195.8 LBS | HEIGHT: 67 IN | RESPIRATION RATE: 16 BRPM | BODY MASS INDEX: 30.73 KG/M2 | TEMPERATURE: 97.3 F | SYSTOLIC BLOOD PRESSURE: 146 MMHG | HEART RATE: 82 BPM | DIASTOLIC BLOOD PRESSURE: 90 MMHG

## 2020-08-25 DIAGNOSIS — M25.472 ANKLE EDEMA, BILATERAL: Primary | ICD-10-CM

## 2020-08-25 DIAGNOSIS — I10 ESSENTIAL HYPERTENSION: ICD-10-CM

## 2020-08-25 DIAGNOSIS — M25.471 ANKLE EDEMA, BILATERAL: Primary | ICD-10-CM

## 2020-08-25 PROCEDURE — 99213 OFFICE O/P EST LOW 20 MIN: CPT | Performed by: FAMILY MEDICINE

## 2020-08-25 PROCEDURE — 3080F DIAST BP >= 90 MM HG: CPT | Performed by: FAMILY MEDICINE

## 2020-08-25 PROCEDURE — 3077F SYST BP >= 140 MM HG: CPT | Performed by: FAMILY MEDICINE

## 2020-08-25 PROCEDURE — 1036F TOBACCO NON-USER: CPT | Performed by: FAMILY MEDICINE

## 2020-08-25 PROCEDURE — 3008F BODY MASS INDEX DOCD: CPT | Performed by: FAMILY MEDICINE

## 2020-08-25 PROCEDURE — 3725F SCREEN DEPRESSION PERFORMED: CPT | Performed by: FAMILY MEDICINE

## 2020-08-25 RX ORDER — HYDROCHLOROTHIAZIDE 12.5 MG/1
12.5 TABLET ORAL DAILY
Qty: 30 TABLET | Refills: 1 | Status: SHIPPED | OUTPATIENT
Start: 2020-08-25 | End: 2021-10-06

## 2020-08-25 NOTE — PROGRESS NOTES
Assessment/Plan: HTN- her  BP   Record  Re viewed   Since  It  Is  High  Today  It  Has  Been  High   Since  2019    Discussed  The  Options   Of  Starting  The  BP  meds hctz 12 5 mg     Doing  A  Basic  BMP  And  Keeping  The  BP  log   She   Agrees   With  The  Plan  Asked  Her to  Follow up  With  Dr Jimy Villegas  In  A  Week      2  Edema   Both  Ankles    And  Pain  On  Wearing  Boots  At  Work   Due  To  Swelling  -   Discussed  Diet  And  Leg  elevation  She  Has h/o  MS   Asked  Her  To   Follow  Up   With  Her  Neurologist     Return   Parameters   Discussed          Problem List Items Addressed This Visit     None      Visit Diagnoses     Ankle edema, bilateral    -  Primary    Relevant Orders    Basic metabolic panel    CBC and differential            Subjective: dr Markie Del Toro  Patient  Is  Here  To  Get  A note  For  Work   Due  To  Edema   Ankles  b/l     Patient ID: Stevenson Woodward is a 45 y o  female  HPI-   She  Denies   Any  H/o  Trauma   Has  High  BP  Today  H/o  MS   However  Denies    Any  Cp  Sob  Headaches  Or  Dizziness     The following portions of the patient's history were reviewed and updated as appropriate:   She has a past medical history of Asthma, Back problem, Headache, migraine, Multiple sclerosis ([facility] [address] ), and Thyroid cancer ([facility] [address] )  ,  does not have any pertinent problems on file  ,   has a past surgical history that includes Thyroidectomy (2016);  section; Hysterectomy (2019); Colonoscopy (2019); and EGD AND COLONOSCOPY (10/30/2019)  ,  family history includes Colon cancer in her maternal grandfather; Hypertension in her maternal grandmother  ,   reports that she has quit smoking  She has never used smokeless tobacco  She reports that she does not drink alcohol or use drugs  ,  is allergic to dimethyl fumarate and glatiramer     Current Outpatient Medications   Medication Sig Dispense Refill    ALPRAZolam (XANAX) 1 mg tablet TAKE ONE TABLET BY MOUTH 4 TIMES A DAY AS NEEDED  1    amitriptyline (ELAVIL) 25 mg tablet Take 1 tablet by mouth      cholecalciferol (VITAMIN D3) 400 units tablet TAKE 1 TABLET(S) EVERY DAY BY ORAL ROUTE WITH MEALS FOR 30 DAYS  12    Cholecalciferol 125 MCG (5000 UT) TABS Take 1 tablet (5,000 Units total) by mouth daily 90 tablet 1    diphenhydrAMINE (BENADRYL) 25 mg capsule 1-2 PO Q6hrs PRN itching 40 capsule 0    famotidine (PEPCID) 20 mg tablet Take 1 tablet (20 mg total) by mouth 2 (two) times a day 30 tablet 0    ferrous sulfate 325 (65 Fe) mg tablet Take 1 tablet (325 mg total) by mouth daily 30 tablet 0    lamoTRIgine (LaMICtal) 100 mg tablet TAKE 2 TABLETS BY MOUTH EVERY MORNING AND TAKE ONE TABLET DAILY AT 5PM  1    levothyroxine 175 mcg tablet TAKE 1 TABLET(S) EVERY DAY BY ORAL ROUTE FOR 90 DAYS  90 tablet 1    levothyroxine 200 mcg tablet Take 200 mcg by mouth daily  0    methimazole (TAPAZOLE) 10 mg tablet Take 3 tablets by mouth daily      traZODone (DESYREL) 100 mg tablet Take 100 mg by mouth daily at bedtime as needed  1    venlafaxine (EFFEXOR-XR) 150 mg 24 hr capsule Take 150 mg by mouth every morning    0     No current facility-administered medications for this visit  Review of Systems   Constitutional: Negative  HENT: Negative  Eyes: Negative  Respiratory: Negative  Cardiovascular: Positive for leg swelling  Negative for chest pain and palpitations  Gastrointestinal: Negative for abdominal distention, abdominal pain and constipation  Genitourinary: Negative for dysuria and flank pain  Musculoskeletal: Negative  Neurological: Negative for dizziness and headaches  Hematological: Negative  Psychiatric/Behavioral: Negative            Objective:  Vitals:    08/25/20 1807   BP: 146/90   BP Location: Left arm   Patient Position: Sitting   Cuff Size: Adult   Pulse: 82   Resp: 16   Temp: (!) 97 3 °F (36 3 °C)   TempSrc: Temporal   SpO2: 96%   Weight: 88 8 kg (195 lb 12 8 oz)   Height: 5' 7" (1 702 m) Body mass index is 30 67 kg/m²  Physical Exam  Vitals signs and nursing note reviewed  Constitutional:       General: She is not in acute distress  Appearance: Normal appearance  She is not ill-appearing, toxic-appearing or diaphoretic  HENT:      Head: Normocephalic and atraumatic  Eyes:      Extraocular Movements: Extraocular movements intact  Pupils: Pupils are equal, round, and reactive to light  Neck:      Musculoskeletal: Neck supple  No neck rigidity  Vascular: No carotid bruit  Cardiovascular:      Rate and Rhythm: Normal rate and regular rhythm  Pulses: Normal pulses  Heart sounds: Normal heart sounds  No murmur  No gallop  Pulmonary:      Effort: Pulmonary effort is normal  No respiratory distress  Breath sounds: Normal breath sounds  No wheezing, rhonchi or rales  Abdominal:      General: There is no distension  Palpations: Abdomen is soft  There is no mass  Tenderness: There is no abdominal tenderness  Hernia: No hernia is present  Musculoskeletal:         General: Swelling present  No tenderness, deformity or signs of injury  Skin:     Findings: No rash  Neurological:      Mental Status: She is alert

## 2020-08-25 NOTE — LETTER
August 25, 2020     Patient: Albert Menendez   YOB: 1980   Date of Visit: 8/25/2020       To Whom it May Concern:    Nicolas Lara is under my professional care  She was seen in my office on 8/25/2020  She If you have any questions or concerns, please don't hesitate to call  she   Is  Excused   For  Work   On  8/25/2020 and  8/26/2020 for  Her  Ankle  Pain  And  Swelling          Sincerely,          Germain Osorio MD        CC: Albert Menendez

## 2020-09-03 DIAGNOSIS — E55.9 VITAMIN D DEFICIENCY: ICD-10-CM

## 2020-09-03 RX ORDER — MAG HYDROX/ALUMINUM HYD/SIMETH 400-400-40
SUSPENSION, ORAL (FINAL DOSE FORM) ORAL
Qty: 90 CAPSULE | Refills: 1 | Status: SHIPPED | OUTPATIENT
Start: 2020-09-03

## 2020-10-13 ENCOUNTER — TRANSCRIBE ORDERS (OUTPATIENT)
Dept: LAB | Facility: CLINIC | Age: 40
End: 2020-10-13

## 2020-10-13 ENCOUNTER — LAB (OUTPATIENT)
Dept: LAB | Facility: CLINIC | Age: 40
End: 2020-10-13
Payer: COMMERCIAL

## 2020-10-13 DIAGNOSIS — E78.2 MIXED HYPERLIPIDEMIA: Primary | ICD-10-CM

## 2020-10-13 DIAGNOSIS — E89.0 POSTSURGICAL HYPOTHYROIDISM: ICD-10-CM

## 2020-10-13 DIAGNOSIS — M25.471 ANKLE EDEMA, BILATERAL: ICD-10-CM

## 2020-10-13 DIAGNOSIS — M25.472 ANKLE EDEMA, BILATERAL: ICD-10-CM

## 2020-10-13 LAB
ALBUMIN SERPL BCP-MCNC: 4.1 G/DL (ref 3.5–5)
ALP SERPL-CCNC: 78 U/L (ref 46–116)
ALT SERPL W P-5'-P-CCNC: 12 U/L (ref 12–78)
ANION GAP SERPL CALCULATED.3IONS-SCNC: 11 MMOL/L (ref 4–13)
AST SERPL W P-5'-P-CCNC: 9 U/L (ref 5–45)
BASOPHILS # BLD AUTO: 0.05 THOUSANDS/ΜL (ref 0–0.1)
BASOPHILS NFR BLD AUTO: 1 % (ref 0–1)
BILIRUB SERPL-MCNC: 0.64 MG/DL (ref 0.2–1)
BUN SERPL-MCNC: 10 MG/DL (ref 5–25)
CALCIUM SERPL-MCNC: 8.8 MG/DL (ref 8.3–10.1)
CHLORIDE SERPL-SCNC: 104 MMOL/L (ref 100–108)
CO2 SERPL-SCNC: 23 MMOL/L (ref 21–32)
CREAT SERPL-MCNC: 0.69 MG/DL (ref 0.6–1.3)
EOSINOPHIL # BLD AUTO: 0.09 THOUSAND/ΜL (ref 0–0.61)
EOSINOPHIL NFR BLD AUTO: 1 % (ref 0–6)
ERYTHROCYTE [DISTWIDTH] IN BLOOD BY AUTOMATED COUNT: 15.9 % (ref 11.6–15.1)
GFR SERPL CREATININE-BSD FRML MDRD: 127 ML/MIN/1.73SQ M
GLUCOSE SERPL-MCNC: 104 MG/DL (ref 65–140)
HCT VFR BLD AUTO: 48.1 % (ref 34.8–46.1)
HGB BLD-MCNC: 15.9 G/DL (ref 11.5–15.4)
IMM GRANULOCYTES # BLD AUTO: 0.03 THOUSAND/UL (ref 0–0.2)
IMM GRANULOCYTES NFR BLD AUTO: 0 % (ref 0–2)
LYMPHOCYTES # BLD AUTO: 2.66 THOUSANDS/ΜL (ref 0.6–4.47)
LYMPHOCYTES NFR BLD AUTO: 35 % (ref 14–44)
MCH RBC QN AUTO: 28.9 PG (ref 26.8–34.3)
MCHC RBC AUTO-ENTMCNC: 33.1 G/DL (ref 31.4–37.4)
MCV RBC AUTO: 88 FL (ref 82–98)
MONOCYTES # BLD AUTO: 0.5 THOUSAND/ΜL (ref 0.17–1.22)
MONOCYTES NFR BLD AUTO: 7 % (ref 4–12)
NEUTROPHILS # BLD AUTO: 4.27 THOUSANDS/ΜL (ref 1.85–7.62)
NEUTS SEG NFR BLD AUTO: 56 % (ref 43–75)
NRBC BLD AUTO-RTO: 0 /100 WBCS
PLATELET # BLD AUTO: 305 THOUSANDS/UL (ref 149–390)
PMV BLD AUTO: 8.9 FL (ref 8.9–12.7)
POTASSIUM SERPL-SCNC: 3.6 MMOL/L (ref 3.5–5.3)
PROT SERPL-MCNC: 8 G/DL (ref 6.4–8.2)
RBC # BLD AUTO: 5.5 MILLION/UL (ref 3.81–5.12)
SODIUM SERPL-SCNC: 138 MMOL/L (ref 136–145)
T3FREE SERPL-MCNC: 1.69 PG/ML (ref 2.3–4.2)
T4 FREE SERPL-MCNC: 0.87 NG/DL (ref 0.76–1.46)
TSH SERPL DL<=0.05 MIU/L-ACNC: 19 UIU/ML (ref 0.36–3.74)
WBC # BLD AUTO: 7.6 THOUSAND/UL (ref 4.31–10.16)

## 2020-10-13 PROCEDURE — 86376 MICROSOMAL ANTIBODY EACH: CPT

## 2020-10-13 PROCEDURE — 84432 ASSAY OF THYROGLOBULIN: CPT

## 2020-10-13 PROCEDURE — 85025 COMPLETE CBC W/AUTO DIFF WBC: CPT

## 2020-10-13 PROCEDURE — 84481 FREE ASSAY (FT-3): CPT

## 2020-10-13 PROCEDURE — 84439 ASSAY OF FREE THYROXINE: CPT

## 2020-10-13 PROCEDURE — 36415 COLL VENOUS BLD VENIPUNCTURE: CPT

## 2020-10-13 PROCEDURE — 84443 ASSAY THYROID STIM HORMONE: CPT

## 2020-10-13 PROCEDURE — 84445 ASSAY OF TSI GLOBULIN: CPT

## 2020-10-13 PROCEDURE — 80053 COMPREHEN METABOLIC PANEL: CPT

## 2020-10-13 PROCEDURE — 86800 THYROGLOBULIN ANTIBODY: CPT

## 2020-10-14 ENCOUNTER — APPOINTMENT (OUTPATIENT)
Dept: LAB | Facility: CLINIC | Age: 40
End: 2020-10-14
Payer: COMMERCIAL

## 2020-10-14 DIAGNOSIS — E78.2 MIXED HYPERLIPIDEMIA: ICD-10-CM

## 2020-10-14 LAB
CHOLEST SERPL-MCNC: 196 MG/DL (ref 50–200)
HDLC SERPL-MCNC: 50 MG/DL
LDLC SERPL CALC-MCNC: 123 MG/DL (ref 0–100)
NONHDLC SERPL-MCNC: 146 MG/DL
THYROPEROXIDASE AB SERPL-ACNC: 15 IU/ML (ref 0–34)
TRIGL SERPL-MCNC: 116 MG/DL

## 2020-10-14 PROCEDURE — 36415 COLL VENOUS BLD VENIPUNCTURE: CPT

## 2020-10-14 PROCEDURE — 80061 LIPID PANEL: CPT

## 2020-10-15 LAB — TSI SER-ACNC: <0.1 IU/L (ref 0–0.55)

## 2020-10-20 DIAGNOSIS — E03.9 ACQUIRED HYPOTHYROIDISM: ICD-10-CM

## 2020-10-21 LAB
THYROGLOB AB SERPL-ACNC: 118.5 IU/ML (ref 0–0.9)
THYROGLOB SERPL-MCNC: 3.5 NG/ML

## 2020-10-21 RX ORDER — LEVOTHYROXINE SODIUM 175 UG/1
TABLET ORAL
Qty: 90 TABLET | Refills: 1 | Status: SHIPPED | OUTPATIENT
Start: 2020-10-21 | End: 2021-01-13

## 2020-11-19 ENCOUNTER — TRANSCRIBE ORDERS (OUTPATIENT)
Dept: LAB | Facility: CLINIC | Age: 40
End: 2020-11-19

## 2020-11-19 ENCOUNTER — LAB (OUTPATIENT)
Dept: LAB | Facility: CLINIC | Age: 40
End: 2020-11-19
Payer: COMMERCIAL

## 2020-11-19 DIAGNOSIS — C73 THYROID CANCER (HCC): ICD-10-CM

## 2020-11-19 DIAGNOSIS — C73 THYROID CANCER (HCC): Primary | ICD-10-CM

## 2020-11-19 DIAGNOSIS — E89.0 POSTSURGICAL HYPOTHYROIDISM: ICD-10-CM

## 2020-11-19 LAB
T3FREE SERPL-MCNC: 3.36 PG/ML (ref 2.3–4.2)
T4 FREE SERPL-MCNC: 2.01 NG/DL (ref 0.76–1.46)
TSH SERPL DL<=0.05 MIU/L-ACNC: 0.02 UIU/ML (ref 0.36–3.74)

## 2020-11-19 PROCEDURE — 86800 THYROGLOBULIN ANTIBODY: CPT

## 2020-11-19 PROCEDURE — 84432 ASSAY OF THYROGLOBULIN: CPT

## 2020-11-19 PROCEDURE — 84439 ASSAY OF FREE THYROXINE: CPT

## 2020-11-19 PROCEDURE — 84443 ASSAY THYROID STIM HORMONE: CPT

## 2020-11-19 PROCEDURE — 36415 COLL VENOUS BLD VENIPUNCTURE: CPT

## 2020-11-19 PROCEDURE — 84481 FREE ASSAY (FT-3): CPT

## 2020-11-25 ENCOUNTER — TELEPHONE (OUTPATIENT)
Dept: FAMILY MEDICINE CLINIC | Facility: CLINIC | Age: 40
End: 2020-11-25

## 2020-11-26 LAB
THYROGLOB AB SERPL-ACNC: 92.7 IU/ML (ref 0–0.9)
THYROGLOB SERPL-MCNC: 3.6 NG/ML

## 2020-12-23 ENCOUNTER — TELEMEDICINE (OUTPATIENT)
Dept: FAMILY MEDICINE CLINIC | Facility: CLINIC | Age: 40
End: 2020-12-23
Payer: COMMERCIAL

## 2020-12-23 VITALS — HEIGHT: 67 IN | BODY MASS INDEX: 30.61 KG/M2 | WEIGHT: 195 LBS

## 2020-12-23 DIAGNOSIS — L68.0 HIRSUTISM: ICD-10-CM

## 2020-12-23 DIAGNOSIS — E55.9 VITAMIN D DEFICIENCY: ICD-10-CM

## 2020-12-23 DIAGNOSIS — D50.0 IRON DEFICIENCY ANEMIA DUE TO CHRONIC BLOOD LOSS: Primary | ICD-10-CM

## 2020-12-23 PROCEDURE — 1036F TOBACCO NON-USER: CPT | Performed by: FAMILY MEDICINE

## 2020-12-23 PROCEDURE — 3008F BODY MASS INDEX DOCD: CPT | Performed by: FAMILY MEDICINE

## 2020-12-23 PROCEDURE — 99213 OFFICE O/P EST LOW 20 MIN: CPT | Performed by: FAMILY MEDICINE

## 2020-12-23 RX ORDER — ASCORBIC ACID 500 MG
500 TABLET ORAL DAILY
Qty: 90 TABLET | Refills: 1 | Status: SHIPPED | OUTPATIENT
Start: 2020-12-23 | End: 2021-03-03

## 2020-12-23 RX ORDER — FERROUS SULFATE TAB EC 324 MG (65 MG FE EQUIVALENT) 324 (65 FE) MG
324 TABLET DELAYED RESPONSE ORAL
Qty: 90 TABLET | Refills: 2 | Status: SHIPPED | OUTPATIENT
Start: 2020-12-23 | End: 2021-10-06

## 2020-12-23 RX ORDER — NATALIZUMAB 300 MG/15ML
300 INJECTION INTRAVENOUS
COMMUNITY

## 2020-12-23 RX ORDER — MAG HYDROX/ALUMINUM HYD/SIMETH 400-400-40
1 SUSPENSION, ORAL (FINAL DOSE FORM) ORAL DAILY
Qty: 90 CAPSULE | Refills: 2 | Status: SHIPPED | OUTPATIENT
Start: 2020-12-23 | End: 2021-06-03

## 2021-01-12 DIAGNOSIS — E03.9 ACQUIRED HYPOTHYROIDISM: ICD-10-CM

## 2021-01-13 RX ORDER — LEVOTHYROXINE SODIUM 175 UG/1
TABLET ORAL
Qty: 90 TABLET | Refills: 1 | Status: SHIPPED | OUTPATIENT
Start: 2021-01-13 | End: 2021-03-19

## 2021-01-20 ENCOUNTER — TRANSCRIBE ORDERS (OUTPATIENT)
Dept: LAB | Facility: CLINIC | Age: 41
End: 2021-01-20

## 2021-01-20 ENCOUNTER — APPOINTMENT (OUTPATIENT)
Dept: LAB | Facility: CLINIC | Age: 41
End: 2021-01-20
Payer: COMMERCIAL

## 2021-01-20 DIAGNOSIS — C73 THYROID CANCER (HCC): ICD-10-CM

## 2021-01-20 DIAGNOSIS — E89.0 POSTSURGICAL HYPOTHYROIDISM: ICD-10-CM

## 2021-01-20 DIAGNOSIS — C73 THYROID CANCER (HCC): Primary | ICD-10-CM

## 2021-01-20 LAB — TSH SERPL DL<=0.05 MIU/L-ACNC: 79.44 UIU/ML (ref 0.36–3.74)

## 2021-01-20 PROCEDURE — 36415 COLL VENOUS BLD VENIPUNCTURE: CPT

## 2021-01-20 PROCEDURE — 86800 THYROGLOBULIN ANTIBODY: CPT

## 2021-01-20 PROCEDURE — 84443 ASSAY THYROID STIM HORMONE: CPT

## 2021-01-20 PROCEDURE — 84432 ASSAY OF THYROGLOBULIN: CPT

## 2021-01-27 LAB
THYROGLOB AB SERPL-ACNC: 71.7 IU/ML (ref 0–0.9)
THYROGLOB SERPL-MCNC: 2.2 NG/ML

## 2021-02-13 ENCOUNTER — APPOINTMENT (OUTPATIENT)
Dept: LAB | Facility: CLINIC | Age: 41
End: 2021-02-13
Payer: COMMERCIAL

## 2021-02-13 DIAGNOSIS — C73 THYROID CANCER (HCC): ICD-10-CM

## 2021-02-13 DIAGNOSIS — E89.0 POSTSURGICAL HYPOTHYROIDISM: ICD-10-CM

## 2021-02-13 LAB
25(OH)D3 SERPL-MCNC: 26 NG/ML (ref 30–100)
ALBUMIN SERPL BCP-MCNC: 3.8 G/DL (ref 3.5–5)
ALP SERPL-CCNC: 80 U/L (ref 46–116)
ALT SERPL W P-5'-P-CCNC: 13 U/L (ref 12–78)
ANION GAP SERPL CALCULATED.3IONS-SCNC: 10 MMOL/L (ref 4–13)
AST SERPL W P-5'-P-CCNC: 12 U/L (ref 5–45)
BILIRUB SERPL-MCNC: 0.95 MG/DL (ref 0.2–1)
BUN SERPL-MCNC: 9 MG/DL (ref 5–25)
CALCIUM SERPL-MCNC: 9 MG/DL (ref 8.3–10.1)
CHLORIDE SERPL-SCNC: 103 MMOL/L (ref 100–108)
CO2 SERPL-SCNC: 25 MMOL/L (ref 21–32)
CREAT SERPL-MCNC: 0.72 MG/DL (ref 0.6–1.3)
GFR SERPL CREATININE-BSD FRML MDRD: 122 ML/MIN/1.73SQ M
GLUCOSE SERPL-MCNC: 100 MG/DL (ref 65–140)
POTASSIUM SERPL-SCNC: 4.2 MMOL/L (ref 3.5–5.3)
PROT SERPL-MCNC: 7.4 G/DL (ref 6.4–8.2)
SODIUM SERPL-SCNC: 138 MMOL/L (ref 136–145)
T3FREE SERPL-MCNC: 1.59 PG/ML (ref 2.3–4.2)
T4 FREE SERPL-MCNC: 1.01 NG/DL (ref 0.76–1.46)
TSH SERPL DL<=0.05 MIU/L-ACNC: 0.12 UIU/ML (ref 0.36–3.74)

## 2021-02-13 PROCEDURE — 84481 FREE ASSAY (FT-3): CPT

## 2021-02-13 PROCEDURE — 82306 VITAMIN D 25 HYDROXY: CPT

## 2021-02-13 PROCEDURE — 84443 ASSAY THYROID STIM HORMONE: CPT

## 2021-02-13 PROCEDURE — 80053 COMPREHEN METABOLIC PANEL: CPT

## 2021-02-13 PROCEDURE — 84439 ASSAY OF FREE THYROXINE: CPT

## 2021-02-13 PROCEDURE — 36415 COLL VENOUS BLD VENIPUNCTURE: CPT

## 2021-03-02 DIAGNOSIS — D50.0 IRON DEFICIENCY ANEMIA DUE TO CHRONIC BLOOD LOSS: ICD-10-CM

## 2021-03-03 RX ORDER — ASCORBIC ACID 500 MG
TABLET ORAL
Qty: 90 TABLET | Refills: 1 | Status: SHIPPED | OUTPATIENT
Start: 2021-03-03 | End: 2021-09-28

## 2021-03-19 DIAGNOSIS — E03.9 ACQUIRED HYPOTHYROIDISM: ICD-10-CM

## 2021-03-19 RX ORDER — LEVOTHYROXINE SODIUM 175 UG/1
TABLET ORAL
Qty: 90 TABLET | Refills: 1 | Status: SHIPPED | OUTPATIENT
Start: 2021-03-19 | End: 2021-10-06

## 2021-06-03 DIAGNOSIS — E55.9 VITAMIN D DEFICIENCY: ICD-10-CM

## 2021-06-03 RX ORDER — MAG HYDROX/ALUMINUM HYD/SIMETH 400-400-40
1 SUSPENSION, ORAL (FINAL DOSE FORM) ORAL DAILY
Qty: 90 CAPSULE | Refills: 2 | Status: SHIPPED | OUTPATIENT
Start: 2021-06-03 | End: 2021-10-20

## 2021-06-11 DIAGNOSIS — D64.9 ANEMIA: ICD-10-CM

## 2021-06-11 RX ORDER — FERROUS SULFATE 325(65) MG
TABLET ORAL
Qty: 90 TABLET | Refills: 1 | Status: SHIPPED | OUTPATIENT
Start: 2021-06-11 | End: 2021-10-06

## 2021-08-03 ENCOUNTER — APPOINTMENT (OUTPATIENT)
Dept: LAB | Facility: CLINIC | Age: 41
End: 2021-08-03
Payer: COMMERCIAL

## 2021-08-03 DIAGNOSIS — E89.0 POSTSURGICAL HYPOTHYROIDISM: ICD-10-CM

## 2021-08-03 DIAGNOSIS — C73 MALIGNANT NEOPLASM OF THYROID GLAND (HCC): ICD-10-CM

## 2021-08-03 DIAGNOSIS — L65.9 BALDNESS: ICD-10-CM

## 2021-08-03 LAB
25(OH)D3 SERPL-MCNC: 25.5 NG/ML (ref 30–100)
ALBUMIN SERPL BCP-MCNC: 4 G/DL (ref 3.5–5)
ALP SERPL-CCNC: 85 U/L (ref 46–116)
ALT SERPL W P-5'-P-CCNC: 13 U/L (ref 12–78)
ANION GAP SERPL CALCULATED.3IONS-SCNC: 12 MMOL/L (ref 4–13)
AST SERPL W P-5'-P-CCNC: 9 U/L (ref 5–45)
BASOPHILS # BLD AUTO: 0.04 THOUSANDS/ΜL (ref 0–0.1)
BASOPHILS NFR BLD AUTO: 1 % (ref 0–1)
BILIRUB SERPL-MCNC: 0.89 MG/DL (ref 0.2–1)
BUN SERPL-MCNC: 11 MG/DL (ref 5–25)
CALCIUM SERPL-MCNC: 8.8 MG/DL (ref 8.3–10.1)
CHLORIDE SERPL-SCNC: 104 MMOL/L (ref 100–108)
CO2 SERPL-SCNC: 23 MMOL/L (ref 21–32)
CREAT SERPL-MCNC: 0.69 MG/DL (ref 0.6–1.3)
EOSINOPHIL # BLD AUTO: 0.09 THOUSAND/ΜL (ref 0–0.61)
EOSINOPHIL NFR BLD AUTO: 2 % (ref 0–6)
ERYTHROCYTE [DISTWIDTH] IN BLOOD BY AUTOMATED COUNT: 15.1 % (ref 11.6–15.1)
EST. AVERAGE GLUCOSE BLD GHB EST-MCNC: 80 MG/DL
GFR SERPL CREATININE-BSD FRML MDRD: 126 ML/MIN/1.73SQ M
GLUCOSE SERPL-MCNC: 103 MG/DL (ref 65–140)
HBA1C MFR BLD: 4.4 %
HCT VFR BLD AUTO: 46.3 % (ref 34.8–46.1)
HGB BLD-MCNC: 15.5 G/DL (ref 11.5–15.4)
IMM GRANULOCYTES # BLD AUTO: 0.06 THOUSAND/UL (ref 0–0.2)
IMM GRANULOCYTES NFR BLD AUTO: 1 % (ref 0–2)
IRON SERPL-MCNC: 73 UG/DL (ref 50–170)
LYMPHOCYTES # BLD AUTO: 2.79 THOUSANDS/ΜL (ref 0.6–4.47)
LYMPHOCYTES NFR BLD AUTO: 47 % (ref 14–44)
MCH RBC QN AUTO: 28.7 PG (ref 26.8–34.3)
MCHC RBC AUTO-ENTMCNC: 33.5 G/DL (ref 31.4–37.4)
MCV RBC AUTO: 86 FL (ref 82–98)
MONOCYTES # BLD AUTO: 0.31 THOUSAND/ΜL (ref 0.17–1.22)
MONOCYTES NFR BLD AUTO: 5 % (ref 4–12)
NEUTROPHILS # BLD AUTO: 2.61 THOUSANDS/ΜL (ref 1.85–7.62)
NEUTS SEG NFR BLD AUTO: 44 % (ref 43–75)
NRBC BLD AUTO-RTO: 0 /100 WBCS
PLATELET # BLD AUTO: 339 THOUSANDS/UL (ref 149–390)
PMV BLD AUTO: 9.4 FL (ref 8.9–12.7)
POTASSIUM SERPL-SCNC: 3.3 MMOL/L (ref 3.5–5.3)
PROT SERPL-MCNC: 7.9 G/DL (ref 6.4–8.2)
RBC # BLD AUTO: 5.4 MILLION/UL (ref 3.81–5.12)
SODIUM SERPL-SCNC: 139 MMOL/L (ref 136–145)
T3FREE SERPL-MCNC: 2.64 PG/ML (ref 2.3–4.2)
T4 FREE SERPL-MCNC: 1.46 NG/DL (ref 0.76–1.46)
TIBC SERPL-MCNC: 292 UG/DL (ref 250–450)
TSH SERPL DL<=0.05 MIU/L-ACNC: 0.18 UIU/ML (ref 0.36–3.74)
WBC # BLD AUTO: 5.9 THOUSAND/UL (ref 4.31–10.16)

## 2021-08-03 PROCEDURE — 80053 COMPREHEN METABOLIC PANEL: CPT

## 2021-08-03 PROCEDURE — 83036 HEMOGLOBIN GLYCOSYLATED A1C: CPT

## 2021-08-03 PROCEDURE — 84432 ASSAY OF THYROGLOBULIN: CPT

## 2021-08-03 PROCEDURE — 84439 ASSAY OF FREE THYROXINE: CPT

## 2021-08-03 PROCEDURE — 82306 VITAMIN D 25 HYDROXY: CPT

## 2021-08-03 PROCEDURE — 85025 COMPLETE CBC W/AUTO DIFF WBC: CPT

## 2021-08-03 PROCEDURE — 86800 THYROGLOBULIN ANTIBODY: CPT

## 2021-08-03 PROCEDURE — 36415 COLL VENOUS BLD VENIPUNCTURE: CPT

## 2021-08-03 PROCEDURE — 84481 FREE ASSAY (FT-3): CPT

## 2021-08-03 PROCEDURE — 84443 ASSAY THYROID STIM HORMONE: CPT

## 2021-08-03 PROCEDURE — 83550 IRON BINDING TEST: CPT

## 2021-08-03 PROCEDURE — 83540 ASSAY OF IRON: CPT

## 2021-08-04 ENCOUNTER — OFFICE VISIT (OUTPATIENT)
Dept: OBGYN CLINIC | Facility: CLINIC | Age: 41
End: 2021-08-04
Payer: COMMERCIAL

## 2021-08-04 VITALS
DIASTOLIC BLOOD PRESSURE: 82 MMHG | BODY MASS INDEX: 25.94 KG/M2 | HEIGHT: 68 IN | SYSTOLIC BLOOD PRESSURE: 124 MMHG | WEIGHT: 171.2 LBS

## 2021-08-04 DIAGNOSIS — N76.0 BV (BACTERIAL VAGINOSIS): Primary | ICD-10-CM

## 2021-08-04 DIAGNOSIS — B96.89 BV (BACTERIAL VAGINOSIS): Primary | ICD-10-CM

## 2021-08-04 PROCEDURE — 99203 OFFICE O/P NEW LOW 30 MIN: CPT | Performed by: OBSTETRICS & GYNECOLOGY

## 2021-08-04 PROCEDURE — 4004F PT TOBACCO SCREEN RCVD TLK: CPT | Performed by: OBSTETRICS & GYNECOLOGY

## 2021-08-04 PROCEDURE — 87510 GARDNER VAG DNA DIR PROBE: CPT

## 2021-08-04 PROCEDURE — 87480 CANDIDA DNA DIR PROBE: CPT

## 2021-08-04 PROCEDURE — 87660 TRICHOMONAS VAGIN DIR PROBE: CPT

## 2021-08-04 PROCEDURE — 3008F BODY MASS INDEX DOCD: CPT | Performed by: OBSTETRICS & GYNECOLOGY

## 2021-08-04 RX ORDER — METRONIDAZOLE 7.5 MG/G
1 GEL VAGINAL DAILY
Qty: 5 G | Refills: 0 | Status: SHIPPED | OUTPATIENT
Start: 2021-08-04 | End: 2021-08-08

## 2021-08-04 NOTE — PROGRESS NOTES
Assessment/Plan:     Diagnoses and all orders for this visit:    BV (bacterial vaginosis)  -     metroNIDAZOLE (METROGEL) 0 75 % vaginal gel; Insert 1 application into the vagina daily for 5 days  -     Molecular Vaginal Panel; Future  -     Molecular Vaginal Panel       73-year-old female   Vaginal discharge   BV  Hypothyroid   Had hysterectomy secondary to bleeding   prior for    smoker cessation recommended  Plan  Female hygiene discussed with patient   Metrogel   Return to office for annual exam  Subjective:      Patient ID: Flores Koroma is a 37 y o  female  Vaginal Discharge  The patient's primary symptoms include vaginal discharge  This is a recurrent problem  The problem occurs intermittently  The problem has been waxing and waning  The patient is experiencing no pain  Pertinent negatives include no back pain, constipation, diarrhea, nausea, rash, urgency or vomiting  The vaginal discharge was malodorous  She has not been passing clots  She has not been passing tissue  Exacerbated by: Douching  She is sexually active  No, her partner does not have an STD  73-year-old female presents to the office today secondary to vaginal discharge  The following portions of the patient's history were reviewed and updated as appropriate: allergies, current medications, past family history, past medical history, past social history, past surgical history and problem list     Review of Systems   Gastrointestinal: Negative for constipation, diarrhea, nausea and vomiting  Genitourinary: Positive for vaginal discharge  Negative for urgency  Musculoskeletal: Negative for back pain  Skin: Negative for rash  Objective:      /82 (BP Location: Right arm, Patient Position: Sitting, Cuff Size: Standard)   Ht 5' 8" (1 727 m)   Wt 77 7 kg (171 lb 3 2 oz)   LMP  (LMP Unknown)   BMI 26 03 kg/m²          Physical Exam  Constitutional:       Appearance: She is well-developed     Abdominal: General: There is no distension  Palpations: Abdomen is soft  Tenderness: There is no abdominal tenderness  Genitourinary:     Labia:         Right: No rash, tenderness or lesion  Left: No rash, tenderness or lesion  Vagina: No signs of injury  No vaginal discharge, erythema or tenderness  Adnexa:         Right: No mass, tenderness or fullness  Left: No mass, tenderness or fullness  Comments:  Wet mount obtained positive for whiff test and clue cell  Neurological:      Mental Status: She is alert and oriented to person, place, and time     Psychiatric:         Behavior: Behavior normal

## 2021-08-05 DIAGNOSIS — N76.0 ACUTE VAGINITIS: Primary | ICD-10-CM

## 2021-08-06 ENCOUNTER — TELEPHONE (OUTPATIENT)
Dept: OBGYN CLINIC | Facility: CLINIC | Age: 41
End: 2021-08-06

## 2021-08-06 DIAGNOSIS — N76.0 BV (BACTERIAL VAGINOSIS): ICD-10-CM

## 2021-08-06 DIAGNOSIS — B96.89 BV (BACTERIAL VAGINOSIS): ICD-10-CM

## 2021-08-06 NOTE — TELEPHONE ENCOUNTER
----- Message from Felipe Pippa sent at 8/5/2021  1:54 PM EDT -----  Regarding: incorrect collection  This patient had an affirm collected instead of a molecular vaginal panel, if you need an affirm done we need an order for it if the lab still has the specimen  Please call the lab with any questions we left a message at the office still no call back   [phone] #1

## 2021-08-08 RX ORDER — METRONIDAZOLE 7.5 MG/G
1 GEL VAGINAL DAILY
Qty: 70 G | Refills: 0 | Status: SHIPPED | OUTPATIENT
Start: 2021-08-08 | End: 2021-08-13

## 2021-08-09 LAB
THYROGLOB AB SERPL-ACNC: 57.9 IU/ML (ref 0–0.9)
THYROGLOB SERPL-MCNC: 3.1 NG/ML

## 2021-09-28 DIAGNOSIS — D50.0 IRON DEFICIENCY ANEMIA DUE TO CHRONIC BLOOD LOSS: ICD-10-CM

## 2021-09-28 RX ORDER — ASCORBIC ACID 500 MG
TABLET ORAL
Qty: 90 TABLET | Refills: 1 | Status: SHIPPED | OUTPATIENT
Start: 2021-09-28 | End: 2021-12-20

## 2021-10-06 ENCOUNTER — ANNUAL EXAM (OUTPATIENT)
Dept: OBGYN CLINIC | Facility: CLINIC | Age: 41
End: 2021-10-06
Payer: COMMERCIAL

## 2021-10-06 VITALS
HEIGHT: 68 IN | WEIGHT: 173.6 LBS | SYSTOLIC BLOOD PRESSURE: 122 MMHG | BODY MASS INDEX: 26.31 KG/M2 | DIASTOLIC BLOOD PRESSURE: 80 MMHG

## 2021-10-06 DIAGNOSIS — Z01.419 ROUTINE GYNECOLOGICAL EXAMINATION: Primary | ICD-10-CM

## 2021-10-06 DIAGNOSIS — F17.200 SMOKER: ICD-10-CM

## 2021-10-06 DIAGNOSIS — Z12.72 SCREENING FOR MALIGNANT NEOPLASM OF VAGINA AFTER TOTAL HYSTERECTOMY: ICD-10-CM

## 2021-10-06 DIAGNOSIS — Z90.710 SCREENING FOR MALIGNANT NEOPLASM OF VAGINA AFTER TOTAL HYSTERECTOMY: ICD-10-CM

## 2021-10-06 DIAGNOSIS — Z12.31 SCREENING MAMMOGRAM, ENCOUNTER FOR: ICD-10-CM

## 2021-10-06 PROCEDURE — G0145 SCR C/V CYTO,THINLAYER,RESCR: HCPCS | Performed by: OBSTETRICS & GYNECOLOGY

## 2021-10-06 PROCEDURE — 99396 PREV VISIT EST AGE 40-64: CPT | Performed by: OBSTETRICS & GYNECOLOGY

## 2021-10-06 PROCEDURE — G0476 HPV COMBO ASSAY CA SCREEN: HCPCS | Performed by: OBSTETRICS & GYNECOLOGY

## 2021-10-06 RX ORDER — NICOTINE 21 MG/24HR
1 PATCH, TRANSDERMAL 24 HOURS TRANSDERMAL EVERY 24 HOURS
Qty: 14 PATCH | Refills: 2 | Status: SHIPPED | OUTPATIENT
Start: 2021-10-06 | End: 2021-10-20

## 2021-10-07 DIAGNOSIS — E55.9 VITAMIN D DEFICIENCY: ICD-10-CM

## 2021-10-07 LAB
HPV HR 12 DNA CVX QL NAA+PROBE: NEGATIVE
HPV16 DNA CVX QL NAA+PROBE: NEGATIVE
HPV18 DNA CVX QL NAA+PROBE: NEGATIVE

## 2021-10-07 RX ORDER — MAG HYDROX/ALUMINUM HYD/SIMETH 400-400-40
1 SUSPENSION, ORAL (FINAL DOSE FORM) ORAL DAILY
Qty: 90 CAPSULE | Refills: 2 | Status: SHIPPED | OUTPATIENT
Start: 2021-10-07 | End: 2022-01-05

## 2021-10-11 LAB
LAB AP GYN PRIMARY INTERPRETATION: NORMAL
Lab: NORMAL

## 2021-10-14 DIAGNOSIS — F17.200 SMOKER: ICD-10-CM

## 2021-10-20 ENCOUNTER — OFFICE VISIT (OUTPATIENT)
Dept: FAMILY MEDICINE CLINIC | Facility: CLINIC | Age: 41
End: 2021-10-20
Payer: COMMERCIAL

## 2021-10-20 VITALS
HEIGHT: 68 IN | WEIGHT: 176 LBS | BODY MASS INDEX: 26.67 KG/M2 | HEART RATE: 62 BPM | RESPIRATION RATE: 18 BRPM | OXYGEN SATURATION: 97 % | DIASTOLIC BLOOD PRESSURE: 84 MMHG | SYSTOLIC BLOOD PRESSURE: 132 MMHG | TEMPERATURE: 97.8 F

## 2021-10-20 DIAGNOSIS — G35 MULTIPLE SCLEROSIS (HCC): Primary | ICD-10-CM

## 2021-10-20 DIAGNOSIS — L68.0 HIRSUTISM: ICD-10-CM

## 2021-10-20 DIAGNOSIS — Z87.891 EX-SMOKER: ICD-10-CM

## 2021-10-20 DIAGNOSIS — Z85.850 HX OF THYROID CANCER: ICD-10-CM

## 2021-10-20 PROCEDURE — 1036F TOBACCO NON-USER: CPT | Performed by: FAMILY MEDICINE

## 2021-10-20 PROCEDURE — 3725F SCREEN DEPRESSION PERFORMED: CPT | Performed by: FAMILY MEDICINE

## 2021-10-20 PROCEDURE — 3008F BODY MASS INDEX DOCD: CPT | Performed by: FAMILY MEDICINE

## 2021-10-20 PROCEDURE — 99214 OFFICE O/P EST MOD 30 MIN: CPT | Performed by: FAMILY MEDICINE

## 2021-10-20 RX ORDER — SPIRONOLACTONE 25 MG/1
25 TABLET ORAL DAILY
Qty: 30 TABLET | Refills: 1 | Status: SHIPPED | OUTPATIENT
Start: 2021-10-20 | End: 2021-12-15

## 2021-10-22 ENCOUNTER — HOSPITAL ENCOUNTER (OUTPATIENT)
Dept: RADIOLOGY | Age: 41
Discharge: HOME | End: 2021-10-22

## 2021-10-22 DIAGNOSIS — C73 MALIGNANT NEOPLASM OF THYROID GLAND (HCC): ICD-10-CM

## 2021-11-16 ENCOUNTER — HOSPITAL ENCOUNTER (OUTPATIENT)
Dept: RADIOLOGY | Age: 41
Discharge: HOME | End: 2021-11-16
Payer: COMMERCIAL

## 2021-11-16 DIAGNOSIS — C73 MALIGNANT NEOPLASM OF THYROID GLAND (HCC): ICD-10-CM

## 2021-11-16 PROCEDURE — 78018 THYROID MET IMAGING BODY: CPT

## 2021-11-16 PROCEDURE — A9509 IODINE I-123 SOD IODIDE MIL: HCPCS

## 2021-11-16 RX ADMIN — THYROTROPIN ALFA 0.9 MG: 0.9 INJECTION, POWDER, FOR SOLUTION INTRAMUSCULAR at 08:40

## 2021-11-17 ENCOUNTER — HOSPITAL ENCOUNTER (OUTPATIENT)
Dept: RADIOLOGY | Age: 41
Discharge: HOME | End: 2021-11-17
Payer: COMMERCIAL

## 2021-11-17 RX ADMIN — THYROTROPIN ALFA 0.9 MG: 0.9 INJECTION, POWDER, FOR SOLUTION INTRAMUSCULAR at 08:40

## 2021-11-18 ENCOUNTER — HOSPITAL ENCOUNTER (OUTPATIENT)
Dept: RADIOLOGY | Age: 41
Discharge: HOME | End: 2021-11-18

## 2021-11-20 ENCOUNTER — APPOINTMENT (OUTPATIENT)
Dept: LAB | Facility: CLINIC | Age: 41
End: 2021-11-20
Payer: COMMERCIAL

## 2021-11-20 DIAGNOSIS — L68.0 HIRSUTISM: ICD-10-CM

## 2021-11-20 DIAGNOSIS — C73 MALIGNANT NEOPLASM OF THYROID GLAND (HCC): ICD-10-CM

## 2021-11-20 LAB
ANION GAP SERPL CALCULATED.3IONS-SCNC: 10 MMOL/L (ref 4–13)
BUN SERPL-MCNC: 10 MG/DL (ref 5–25)
CALCIUM SERPL-MCNC: 9 MG/DL (ref 8.3–10.1)
CHLORIDE SERPL-SCNC: 103 MMOL/L (ref 100–108)
CO2 SERPL-SCNC: 27 MMOL/L (ref 21–32)
CREAT SERPL-MCNC: 0.71 MG/DL (ref 0.6–1.3)
GFR SERPL CREATININE-BSD FRML MDRD: 123 ML/MIN/1.73SQ M
GLUCOSE P FAST SERPL-MCNC: 89 MG/DL (ref 65–99)
POTASSIUM SERPL-SCNC: 4.7 MMOL/L (ref 3.5–5.3)
SODIUM SERPL-SCNC: 140 MMOL/L (ref 136–145)

## 2021-11-20 PROCEDURE — 86800 THYROGLOBULIN ANTIBODY: CPT

## 2021-11-20 PROCEDURE — 84432 ASSAY OF THYROGLOBULIN: CPT

## 2021-11-20 PROCEDURE — 80048 BASIC METABOLIC PNL TOTAL CA: CPT

## 2021-11-20 PROCEDURE — 36415 COLL VENOUS BLD VENIPUNCTURE: CPT

## 2021-12-01 LAB
THYROGLOB AB SERPL-ACNC: 57.6 IU/ML (ref 0–0.9)
THYROGLOB SERPL-MCNC: 3.3 NG/ML

## 2021-12-14 DIAGNOSIS — L68.0 HIRSUTISM: ICD-10-CM

## 2021-12-15 RX ORDER — SPIRONOLACTONE 25 MG/1
TABLET ORAL
Qty: 30 TABLET | Refills: 1 | Status: SHIPPED | OUTPATIENT
Start: 2021-12-15 | End: 2022-01-06

## 2021-12-20 DIAGNOSIS — D50.0 IRON DEFICIENCY ANEMIA DUE TO CHRONIC BLOOD LOSS: ICD-10-CM

## 2021-12-20 RX ORDER — ASCORBIC ACID 500 MG
TABLET ORAL
Qty: 90 TABLET | Refills: 1 | Status: SHIPPED | OUTPATIENT
Start: 2021-12-20 | End: 2022-05-31

## 2022-01-06 DIAGNOSIS — L68.0 HIRSUTISM: ICD-10-CM

## 2022-01-06 RX ORDER — SPIRONOLACTONE 25 MG/1
TABLET ORAL
Qty: 30 TABLET | Refills: 1 | Status: SHIPPED | OUTPATIENT
Start: 2022-01-06 | End: 2022-02-08

## 2022-01-13 ENCOUNTER — HOSPITAL ENCOUNTER (OUTPATIENT)
Dept: RADIOLOGY | Age: 42
Discharge: HOME | End: 2022-01-13
Payer: MEDICARE

## 2022-01-13 VITALS — WEIGHT: 178 LBS | HEIGHT: 68 IN | BODY MASS INDEX: 26.98 KG/M2

## 2022-01-13 DIAGNOSIS — Z12.31 SCREENING MAMMOGRAM, ENCOUNTER FOR: ICD-10-CM

## 2022-01-13 PROCEDURE — 77067 SCR MAMMO BI INCL CAD: CPT

## 2022-01-13 PROCEDURE — 77063 BREAST TOMOSYNTHESIS BI: CPT

## 2022-01-26 ENCOUNTER — IMMUNIZATIONS (OUTPATIENT)
Dept: FAMILY MEDICINE CLINIC | Facility: HOSPITAL | Age: 42
End: 2022-01-26

## 2022-01-26 DIAGNOSIS — Z23 ENCOUNTER FOR IMMUNIZATION: Primary | ICD-10-CM

## 2022-01-26 PROCEDURE — 91306 COVID-19 MODERNA VACC 0.25 ML BOOSTER: CPT

## 2022-01-26 PROCEDURE — 0064A COVID-19 MODERNA VACC 0.25 ML BOOSTER: CPT

## 2022-02-08 DIAGNOSIS — L68.0 HIRSUTISM: ICD-10-CM

## 2022-02-08 RX ORDER — SPIRONOLACTONE 25 MG/1
TABLET ORAL
Qty: 30 TABLET | Refills: 1 | Status: SHIPPED | OUTPATIENT
Start: 2022-02-08 | End: 2022-03-02

## 2022-03-02 DIAGNOSIS — L68.0 HIRSUTISM: ICD-10-CM

## 2022-03-02 RX ORDER — SPIRONOLACTONE 25 MG/1
TABLET ORAL
Qty: 30 TABLET | Refills: 1 | Status: SHIPPED | OUTPATIENT
Start: 2022-03-02

## 2022-05-28 DIAGNOSIS — D50.0 IRON DEFICIENCY ANEMIA DUE TO CHRONIC BLOOD LOSS: ICD-10-CM

## 2022-05-31 RX ORDER — ASCORBIC ACID 500 MG
TABLET ORAL
Qty: 90 TABLET | Refills: 1 | Status: SHIPPED | OUTPATIENT
Start: 2022-05-31

## 2022-08-23 ENCOUNTER — APPOINTMENT (OUTPATIENT)
Dept: LAB | Facility: HOSPITAL | Age: 42
End: 2022-08-23
Payer: MEDICARE

## 2022-08-23 DIAGNOSIS — C73 MALIGNANT NEOPLASM OF THYROID GLAND (HCC): ICD-10-CM

## 2022-08-23 DIAGNOSIS — E89.0 POSTSURGICAL HYPOTHYROIDISM: ICD-10-CM

## 2022-08-23 LAB
T3FREE SERPL-MCNC: 2.18 PG/ML (ref 2.3–4.2)
T4 FREE SERPL-MCNC: 1.07 NG/DL (ref 0.76–1.46)
TSH SERPL DL<=0.05 MIU/L-ACNC: 4.49 UIU/ML (ref 0.45–4.5)

## 2022-08-23 PROCEDURE — 84481 FREE ASSAY (FT-3): CPT

## 2022-08-23 PROCEDURE — 86800 THYROGLOBULIN ANTIBODY: CPT

## 2022-08-23 PROCEDURE — 84443 ASSAY THYROID STIM HORMONE: CPT

## 2022-08-23 PROCEDURE — 84432 ASSAY OF THYROGLOBULIN: CPT

## 2022-08-23 PROCEDURE — 84439 ASSAY OF FREE THYROXINE: CPT

## 2022-08-23 PROCEDURE — 36415 COLL VENOUS BLD VENIPUNCTURE: CPT

## 2022-09-04 LAB
THYROGLOB AB SERPL-ACNC: 46.5 IU/ML (ref 0–0.9)
THYROGLOB SERPL-MCNC: 3 NG/ML

## 2022-09-08 LAB — MISCELLANEOUS LAB TEST RESULT: NORMAL

## 2023-01-11 ENCOUNTER — OFFICE VISIT (OUTPATIENT)
Dept: FAMILY MEDICINE CLINIC | Facility: CLINIC | Age: 43
End: 2023-01-11

## 2023-01-11 VITALS
OXYGEN SATURATION: 98 % | RESPIRATION RATE: 16 BRPM | SYSTOLIC BLOOD PRESSURE: 120 MMHG | HEART RATE: 82 BPM | BODY MASS INDEX: 29.31 KG/M2 | HEIGHT: 68 IN | TEMPERATURE: 97.8 F | DIASTOLIC BLOOD PRESSURE: 70 MMHG | WEIGHT: 193.4 LBS

## 2023-01-11 DIAGNOSIS — G35 MULTIPLE SCLEROSIS (HCC): Primary | ICD-10-CM

## 2023-01-11 DIAGNOSIS — Z11.4 SCREENING FOR HIV (HUMAN IMMUNODEFICIENCY VIRUS): ICD-10-CM

## 2023-01-11 DIAGNOSIS — M62.89 PELVIC FLOOR DYSFUNCTION IN FEMALE: ICD-10-CM

## 2023-01-11 DIAGNOSIS — D50.0 IRON DEFICIENCY ANEMIA DUE TO CHRONIC BLOOD LOSS: ICD-10-CM

## 2023-01-11 DIAGNOSIS — E55.9 VITAMIN D DEFICIENCY: ICD-10-CM

## 2023-01-11 DIAGNOSIS — Z13.6 SCREENING FOR CARDIOVASCULAR CONDITION: ICD-10-CM

## 2023-01-11 DIAGNOSIS — E54 ASCORBIC ACID DEFICIENCY: ICD-10-CM

## 2023-01-11 DIAGNOSIS — Z85.850 HX OF THYROID CANCER: ICD-10-CM

## 2023-01-11 DIAGNOSIS — L68.0 HIRSUTISM: ICD-10-CM

## 2023-01-11 DIAGNOSIS — Z00.00 ANNUAL PHYSICAL EXAM: ICD-10-CM

## 2023-01-11 DIAGNOSIS — Z98.891 HISTORY OF C-SECTION: ICD-10-CM

## 2023-01-11 DIAGNOSIS — E53.8 CYANOCOBALAMIN DEFICIENCY: ICD-10-CM

## 2023-01-11 DIAGNOSIS — Z72.0 TOBACCO USE: ICD-10-CM

## 2023-01-11 DIAGNOSIS — Z11.59 NEED FOR HEPATITIS C SCREENING TEST: ICD-10-CM

## 2023-01-11 RX ORDER — NICOTINE 21 MG/24HR
1 PATCH, TRANSDERMAL 24 HOURS TRANSDERMAL EVERY 24 HOURS
Qty: 28 PATCH | Refills: 0 | Status: SHIPPED | OUTPATIENT
Start: 2023-01-11

## 2023-01-11 RX ORDER — SPIRONOLACTONE 25 MG/1
25 TABLET ORAL DAILY
Qty: 90 TABLET | Refills: 2 | Status: SHIPPED | OUTPATIENT
Start: 2023-01-11

## 2023-01-11 NOTE — PROGRESS NOTES
ADULT ANNUAL [address], [address] [facility]    NAME: Declan Adamson  AGE: 40 y o  SEX: female  : 1980     DATE: 2023     Assessment and Plan:       Has h/o thyroid cancer fu w endo  Has MS fu w neuro on infusion  Works full time  H/o 4 c/s=now w pelvic floor weakness=refer to PT  Start nicotine patch for quit smoking=offer chantix when pt ready  Fu blood work  Fu w gyn  Mammogram normal  Restart spironolactone for hirsutism  Advised for tdap/flu/pneumonia=pt can get these at nurse visit    Problem List Items Addressed This Visit        Nervous and Auditory    Multiple sclerosis ([address] ) - Primary       Musculoskeletal and Integument    Hirsutism    Relevant Medications    spironolactone (ALDACTONE) 25 mg tablet    Pelvic floor dysfunction in female    Relevant Orders    Ambulatory Referral to Physical Therapy       Other    Iron deficiency anemia due to chronic blood loss    Relevant Orders    UA w Reflex to Microscopic w Reflex to Culture    Vitamin D deficiency    Relevant Orders    Vitamin D 25 hydroxy    Hx of thyroid cancer    Tobacco use    Relevant Medications    nicotine (NICODERM CQ) 14 mg/24hr TD 24 hr patch    Ascorbic acid deficiency    Relevant Orders    Vitamin C    History of     Relevant Orders    Ambulatory Referral to Physical Therapy   Other Visit Diagnoses     Screening for cardiovascular condition        Relevant Orders    Lipid Panel with Direct LDL reflex    Cyanocobalamin deficiency        Relevant Orders    Vitamin B12    Need for hepatitis C screening test        Relevant Orders    Hepatitis C Antibody ([facility])    Screening for HIV (human immunodeficiency virus)        Relevant Orders    HIV 1/2 AG/AB w Reflex [facility] for 2 yr old and above    Annual physical exam              Immunizations and preventive care screenings were discussed with patient today   Appropriate education was printed on patient's after visit summary  Counseling:  Dental Health: discussed importance of regular tooth brushing, flossing, and dental visits  Injury prevention: discussed safety/seat belts, safety helmets, smoke detectors, carbon dioxide detectors, and smoking near bedding or upholstery  Sexual health: discussed sexually transmitted diseases, partner selection, use of condoms, avoidance of unintended pregnancy, and contraceptive alternatives  Exercise: the importance of regular exercise/physical activity was discussed  Recommend exercise 3-5 times per week for at least 30 minutes  BMI Counseling: Body mass index is 29 41 kg/m²  The BMI is above normal  Nutrition recommendations include decreasing portion sizes, encouraging healthy choices of fruits and vegetables, limiting drinks that contain sugar and reducing intake of cholesterol  Exercise recommendations include exercising 3-5 times per week  No pharmacotherapy was ordered  Rationale for BMI follow-up plan is due to patient being overweight or obese  Depression Screening and Follow-up Plan: Patient was screened for depression during today's encounter  They screened negative with a PHQ-2 score of 0  Return for nurse visit for tdap/pneumonia 20, established care w dr Taina Plata in 6 m  Chief Complaint:     Chief Complaint   Patient presents with   • Annual Exam     Yearly exam       History of Present Illness:     Adult Annual Physical   Patient here for a comprehensive physical exam  The patient reports no problems  Diet and Physical Activity  Diet/Nutrition: well balanced diet  Exercise: walking  Depression Screening  PHQ-2/9 Depression Screening    Little interest or pleasure in doing things: 0 - not at all  Feeling down, depressed, or hopeless: 0 - not at all  PHQ-2 Score: 0  PHQ-2 Interpretation: Negative depression screen       General Health  Sleep: sleeps well  Hearing: normal - bilateral   Vision: no vision problems  Dental: regular dental visits  /GYN Health  Patient is: premenopausal  Last menstrual period: 2 weeks ago  Contraceptive method: none  Review of Systems:     Review of Systems   Constitutional: Negative for activity change, appetite change, fatigue and unexpected weight change  HENT: Negative for ear pain, sore throat, trouble swallowing and voice change  Eyes: Negative for photophobia and visual disturbance  Respiratory: Negative for cough, chest tightness, shortness of breath and wheezing  Cardiovascular: Negative for chest pain, palpitations and leg swelling  Gastrointestinal: Negative for abdominal pain, constipation, diarrhea, nausea, rectal pain and vomiting  Endocrine: Negative for cold intolerance, polydipsia and polyuria  Genitourinary: Negative for difficulty urinating, dysuria, flank pain, menstrual problem and pelvic pain  Musculoskeletal: Negative for arthralgias, joint swelling and myalgias  Skin: Negative for color change and rash  Allergic/Immunologic: Negative for environmental allergies and immunocompromised state  Neurological: Negative for dizziness, weakness, numbness and headaches  Hematological: Negative for adenopathy  Does not bruise/bleed easily  Psychiatric/Behavioral: Negative for decreased concentration, dysphoric mood, self-injury, sleep disturbance and suicidal ideas  The patient is not nervous/anxious         Past Medical History:     Past Medical History:   Diagnosis Date   • Asthma     no inhaler use    • Back problem    • Cancer [facility])     THYROID CANCER   • Ex-smoker 10/20/2021   • Headache, migraine    • Hirsutism 2020   • Hx of thyroid cancer 10/20/2021    Papillary =dx    • Hyperthyroidism    • Multiple sclerosis [address] [address] )    • Thyroid cancer ([facility] [address] )    • Vitamin D deficiency 2020      Past Surgical History:     Past Surgical History:   Procedure Laterality Date   •  SECTION      ,   and 3/23/2012    • COLONOSCOPY  2019    dr Willem Vega   • EGD AND COLONOSCOPY  10/30/2019    antral gastritis=dr saunders   • HYSTERECTOMY  2019    Robotic laparoscopic hysterectomy bilateral salpingectomy   • THYROIDECTOMY  2016      Social History:     Social History     Socioeconomic History   • Marital status: /Civil Union     Spouse name: None   • Number of children: None   • Years of education: 12   • Highest education level: None   Occupational History   • Occupation: unemployed    Tobacco Use   • Smoking status: Former     Packs/day: 0 25     Years: 15 00     Pack years: 3 75     Types: Cigarettes   • Smokeless tobacco: Never   • Tobacco comments:     Current some day smoker, 1 PPW - As per [employer]   • Vaping Use: Former   • Substances: THC, Flavoring   Substance and Sexual Activity   • Alcohol use: Yes     Comment: Alcohol intake:   Occasional  - As per [place]    • Drug use: Yes     Types: Marijuana   • Sexual activity: Yes     Partners: Male   Other Topics Concern   • None   Social History Narrative    · Most recent tobacco use screenin2019      · Do you currently or have you served in the [employer]:   No      · Were you activated, into active duty, as a member of the [employer] or as a Reservist:   No      · Sexual orientation:   Heterosexual      · Exercise level:   None      · Diet:   Regular      · General stress level:   Medium      · Has smoked since age:   17      · Caffeine intake:    Moderate     · Guns present in home:   No      · Seat belts used routinely:   Yes      · Sunscreen used routinely:   No      · Live alone or with others:   with others      · Smoke alarm in home:   Yes     - As per [place]      Social Determinants of Health     Financial Resource Strain: Not on file   Food Insecurity: Not on file   Transportation Needs: Not on file   Physical Activity: Not on file   Stress: Not on file   Social Connections: Not on file   Intimate Partner Violence: Not on file   Housing Stability: Not on file      Family History:     Family History   Problem Relation Age of Onset   • Hypertension Maternal Grandmother    • Colon cancer Maternal Grandfather    • No Known Problems Mother    • Hepatitis Father    • No Known Problems Maternal Aunt    • No Known Problems Maternal Aunt    • No Known Problems Maternal Aunt       Current Medications:     Current Outpatient Medications   Medication Sig Dispense Refill   • ascorbic acid (VITAMIN C) 500 mg tablet TAKE 1 TABLET (500 MG TOTAL) BY MOUTH DAILY 90 tablet 1   • levothyroxine 200 mcg tablet Take 200 mcg by mouth daily  0   • natalizumab (Tysabri) 300 mg/15 mL Infuse 300 mg into a venous catheter     • nicotine (NICODERM CQ) 14 mg/24hr TD 24 hr patch Place 1 patch on the skin every 24 hours 28 patch 0   • spironolactone (ALDACTONE) 25 mg tablet Take 1 tablet (25 mg total) by mouth daily 90 tablet 2     No current facility-administered medications for this visit  Allergies: Allergies   Allergen Reactions   • Dimethyl Fumarate Itching   • Glatiramer Hives      Physical Exam:     /70 (BP Location: Left arm, Patient Position: Sitting, Cuff Size: Standard)   Pulse 82   Temp 97 8 °F (36 6 °C) (Temporal)   Resp 16   Ht 5' 8" (1 727 m)   Wt 87 7 kg (193 lb 6 4 oz)   LMP  (LMP Unknown)   SpO2 98%   BMI 29 41 kg/m²     Physical Exam  Vitals and nursing note reviewed  Constitutional:       General: She is not in acute distress  Appearance: Normal appearance  She is well-developed  HENT:      Head: Normocephalic and atraumatic  Right Ear: Tympanic membrane, ear canal and external ear normal       Left Ear: Tympanic membrane, ear canal and external ear normal       Nose: Nose normal       Mouth/Throat:      Mouth: Mucous membranes are moist       Pharynx: Oropharynx is clear  Eyes:      Extraocular Movements: Extraocular movements intact  Conjunctiva/sclera: Conjunctivae normal       Pupils: Pupils are equal, round, and reactive to light  Cardiovascular:      Rate and Rhythm: Normal rate and regular rhythm  Pulses: Normal pulses  Heart sounds: Normal heart sounds  No murmur heard  Pulmonary:      Effort: Pulmonary effort is normal  No respiratory distress  Breath sounds: Normal breath sounds  Abdominal:      General: Abdomen is flat  Bowel sounds are normal       Palpations: Abdomen is soft  Tenderness: There is no abdominal tenderness  Musculoskeletal:         General: No swelling  Normal range of motion  Cervical back: Normal range of motion and neck supple  Skin:     General: Skin is warm and dry  Capillary Refill: Capillary refill takes less than 2 seconds  Neurological:      General: No focal deficit present  Mental Status: She is alert and oriented to person, place, and time  Mental status is at baseline  Psychiatric:         Mood and Affect: Mood normal          Behavior: Behavior normal          Thought Content:  Thought content normal          Judgment: Judgment normal           Tere Hernandez MD  [facility]

## 2023-01-11 NOTE — PATIENT INSTRUCTIONS

## 2023-01-28 ENCOUNTER — APPOINTMENT (OUTPATIENT)
Dept: LAB | Facility: HOSPITAL | Age: 43
End: 2023-01-28
Attending: FAMILY MEDICINE

## 2023-01-28 DIAGNOSIS — E54 ASCORBIC ACID DEFICIENCY: ICD-10-CM

## 2023-01-28 DIAGNOSIS — Z11.4 SCREENING FOR HIV (HUMAN IMMUNODEFICIENCY VIRUS): ICD-10-CM

## 2023-01-28 DIAGNOSIS — E53.8 CYANOCOBALAMIN DEFICIENCY: ICD-10-CM

## 2023-01-28 DIAGNOSIS — Z11.59 NEED FOR HEPATITIS C SCREENING TEST: ICD-10-CM

## 2023-01-28 DIAGNOSIS — E53.8 VITAMIN B 12 DEFICIENCY: ICD-10-CM

## 2023-01-28 DIAGNOSIS — C73: ICD-10-CM

## 2023-01-28 DIAGNOSIS — Z13.6 SCREENING FOR CARDIOVASCULAR CONDITION: ICD-10-CM

## 2023-01-28 DIAGNOSIS — E89.0 POSTSURGICAL HYPOTHYROIDISM: ICD-10-CM

## 2023-01-28 DIAGNOSIS — E55.9 VITAMIN D DEFICIENCY: ICD-10-CM

## 2023-01-28 LAB
25(OH)D3 SERPL-MCNC: 11.5 NG/ML (ref 30–100)
ALBUMIN SERPL BCP-MCNC: 4.2 G/DL (ref 3.5–5)
ALP SERPL-CCNC: 69 U/L (ref 34–104)
ALT SERPL W P-5'-P-CCNC: 9 U/L (ref 7–52)
ANION GAP SERPL CALCULATED.3IONS-SCNC: 7 MMOL/L (ref 4–13)
AST SERPL W P-5'-P-CCNC: 8 U/L (ref 13–39)
BASOPHILS # BLD AUTO: 0.06 THOUSANDS/ÂΜL (ref 0–0.1)
BASOPHILS NFR BLD AUTO: 1 % (ref 0–1)
BILIRUB SERPL-MCNC: 0.82 MG/DL (ref 0.2–1)
BUN SERPL-MCNC: 10 MG/DL (ref 5–25)
CALCIUM SERPL-MCNC: 9.1 MG/DL (ref 8.4–10.2)
CHLORIDE SERPL-SCNC: 105 MMOL/L (ref 96–108)
CHOLEST SERPL-MCNC: 182 MG/DL
CO2 SERPL-SCNC: 25 MMOL/L (ref 21–32)
CREAT SERPL-MCNC: 0.73 MG/DL (ref 0.6–1.3)
EOSINOPHIL # BLD AUTO: 0.14 THOUSAND/ÂΜL (ref 0–0.61)
EOSINOPHIL NFR BLD AUTO: 2 % (ref 0–6)
ERYTHROCYTE [DISTWIDTH] IN BLOOD BY AUTOMATED COUNT: 15.5 % (ref 11.6–15.1)
GFR SERPL CREATININE-BSD FRML MDRD: 102 ML/MIN/1.73SQ M
GLUCOSE P FAST SERPL-MCNC: 88 MG/DL (ref 65–99)
HCT VFR BLD AUTO: 46.2 % (ref 34.8–46.1)
HCV AB SER QL: NORMAL
HDLC SERPL-MCNC: 44 MG/DL
HGB BLD-MCNC: 16.4 G/DL (ref 11.5–15.4)
HIV 1+2 AB+HIV1 P24 AG SERPL QL IA: NORMAL
HIV 2 AB SERPL QL IA: NORMAL
HIV1 AB SERPL QL IA: NORMAL
HIV1 P24 AG SERPL QL IA: NORMAL
IMM GRANULOCYTES # BLD AUTO: 0.01 THOUSAND/UL (ref 0–0.2)
IMM GRANULOCYTES NFR BLD AUTO: 0 % (ref 0–2)
LDLC SERPL CALC-MCNC: 125 MG/DL (ref 0–100)
LYMPHOCYTES # BLD AUTO: 4.97 THOUSANDS/ÂΜL (ref 0.6–4.47)
LYMPHOCYTES NFR BLD AUTO: 61 % (ref 14–44)
MCH RBC QN AUTO: 28.8 PG (ref 26.8–34.3)
MCHC RBC AUTO-ENTMCNC: 35.5 G/DL (ref 31.4–37.4)
MCV RBC AUTO: 81 FL (ref 82–98)
MONOCYTES # BLD AUTO: 0.41 THOUSAND/ÂΜL (ref 0.17–1.22)
MONOCYTES NFR BLD AUTO: 5 % (ref 4–12)
NEUTROPHILS # BLD AUTO: 2.47 THOUSANDS/ÂΜL (ref 1.85–7.62)
NEUTS SEG NFR BLD AUTO: 31 % (ref 43–75)
NRBC BLD AUTO-RTO: 0 /100 WBCS
PLATELET # BLD AUTO: 305 THOUSANDS/UL (ref 149–390)
PMV BLD AUTO: 9 FL (ref 8.9–12.7)
POTASSIUM SERPL-SCNC: 3.6 MMOL/L (ref 3.5–5.3)
PROT SERPL-MCNC: 7.2 G/DL (ref 6.4–8.4)
RBC # BLD AUTO: 5.69 MILLION/UL (ref 3.81–5.12)
SODIUM SERPL-SCNC: 137 MMOL/L (ref 135–147)
T3FREE SERPL-MCNC: 1.97 PG/ML (ref 2.3–4.2)
T4 FREE SERPL-MCNC: 1.21 NG/DL (ref 0.76–1.46)
TRIGL SERPL-MCNC: 63 MG/DL
TSH SERPL DL<=0.05 MIU/L-ACNC: 0.33 UIU/ML (ref 0.45–4.5)
VIT B12 SERPL-MCNC: 551 PG/ML (ref 100–900)
WBC # BLD AUTO: 8.06 THOUSAND/UL (ref 4.31–10.16)

## 2023-02-01 LAB — VIT C SERPL-MCNC: 0.1 MG/DL (ref 0.4–2)

## 2023-02-02 DIAGNOSIS — E53.8 CYANOCOBALAMIN DEFICIENCY: Primary | ICD-10-CM

## 2023-02-02 DIAGNOSIS — E55.9 VITAMIN D DEFICIENCY: ICD-10-CM

## 2023-02-02 DIAGNOSIS — D50.0 IRON DEFICIENCY ANEMIA DUE TO CHRONIC BLOOD LOSS: ICD-10-CM

## 2023-02-02 RX ORDER — LANOLIN ALCOHOL/MO/W.PET/CERES
2000 CREAM (GRAM) TOPICAL DAILY
Qty: 180 TABLET | Refills: 3 | Status: SHIPPED | OUTPATIENT
Start: 2023-02-02 | End: 2023-05-03

## 2023-02-03 ENCOUNTER — TELEPHONE (OUTPATIENT)
Dept: FAMILY MEDICINE CLINIC | Facility: CLINIC | Age: 43
End: 2023-02-03

## 2023-02-03 NOTE — TELEPHONE ENCOUNTER
----- Message from Tere Mace MD sent at 2/2/2023  6:07 PM EST -----  Please let pt know her blood work show her vitamin c ,d, and b12 levels are low  Recommend to take vitamin c 1000 mg, vitamin b12 2000 mcg, vitamin d 5000 units daily with calcium 600 mg daily  Her thyroid level not therapeutic   She needs to fu w her endocrinologist for eval  Vitamins were sent to [address] [place] [address]

## 2023-02-03 NOTE — TELEPHONE ENCOUNTER
CALLED AND LEFT MESSAGE ON [employer]IC[employer] NOTIFYING MEDICATIONS SENT TO PHARMACY AND RESULTS, QUESTIONS TO CALL OFFICE  Michel Navarrete

## 2023-02-11 LAB
THYROGLOB AB SERPL-ACNC: 23.8 IU/ML (ref 0–0.9)
THYROGLOB SERPL-MCNC: 3 NG/ML

## 2023-03-03 ENCOUNTER — OFFICE VISIT (OUTPATIENT)
Dept: FAMILY MEDICINE CLINIC | Facility: CLINIC | Age: 43
End: 2023-03-03

## 2023-03-03 VITALS
OXYGEN SATURATION: 97 % | SYSTOLIC BLOOD PRESSURE: 124 MMHG | WEIGHT: 194.2 LBS | BODY MASS INDEX: 29.43 KG/M2 | DIASTOLIC BLOOD PRESSURE: 80 MMHG | TEMPERATURE: 97.1 F | HEIGHT: 68 IN | HEART RATE: 88 BPM | RESPIRATION RATE: 18 BRPM

## 2023-03-03 DIAGNOSIS — Z98.891 HISTORY OF C-SECTION: ICD-10-CM

## 2023-03-03 DIAGNOSIS — E89.0 POSTOPERATIVE HYPOTHYROIDISM: Primary | ICD-10-CM

## 2023-03-03 DIAGNOSIS — L98.9 FACIAL SKIN LESION: ICD-10-CM

## 2023-03-03 DIAGNOSIS — E55.9 VITAMIN D DEFICIENCY: ICD-10-CM

## 2023-03-03 DIAGNOSIS — L68.0 HIRSUTISM: ICD-10-CM

## 2023-03-03 NOTE — ASSESSMENT & PLAN NOTE
4 C-sections  She has a weak abdominal wall muscles due to 4 pregnancies  Patient would like to see a plastic surgeon for any possible reconstruction can be done or not

## 2023-03-03 NOTE — ASSESSMENT & PLAN NOTE
Says within normal limit  On levothyroxine 200 mcg daily    She is also regularly followed by her endocrinologist

## 2023-03-03 NOTE — PROGRESS NOTES
Name: Fior Posadas      : 1980      MRN: [id number]  Encounter Provider: Greer Gaytan MD  Encounter Date: 3/3/2023   Encounter department: [address] [place] [address]  Postoperative hypothyroidism  Assessment & Plan:  Says within normal limit  On levothyroxine 200 mcg daily  She is also regularly followed by her endocrinologist      2  Facial skin lesion  Assessment & Plan:  Refer  to dermatologist    Orders:  -     Ambulatory Referral to Dermatology; Future    3  Vitamin D deficiency    4  Hirsutism    5  History of   Assessment & Plan:  4 C-sections  She has a weak abdominal wall muscles due to 4 pregnancies  Patient would like to see a plastic surgeon for any possible reconstruction can be done or not  Orders:  -     Ambulatory Referral to Plastic Surgery; Future         Subjective      1  Facial lesions-she has noticed some dark spots around her facial hair  No pimples or discharge  No pain  2  Hypothyroidism-has been on levothyroxine  She had thyroidectomy due to thyroid cancer  She is regularly followed by endocrinologist   No significant weight changes  No fatigue  No hair loss or memory loss  No cold or heat intolerance  TSH in normal range  3  Abdominal protruderance-she had for   Her abdominal wall muscles are loose  She wants to get it fixed  Review of Systems   Constitutional: Negative for appetite change, chills, diaphoresis, fatigue and fever  HENT: Negative for congestion, drooling and sinus pain  Eyes: Negative for discharge and itching  Respiratory: Negative for cough, chest tightness and shortness of breath  Cardiovascular: Negative for chest pain, palpitations and leg swelling  Gastrointestinal: Negative  Endocrine: Negative for polyphagia and polyuria  Genitourinary: Negative for difficulty urinating, dysuria, frequency and urgency  Skin: Negative for pallor and rash     Allergic/Immunologic: Negative for food allergies  Neurological: Negative for dizziness, seizures, speech difficulty, light-headedness, numbness and headaches  Hematological: Negative for adenopathy  Does not bruise/bleed easily  Psychiatric/Behavioral: Negative for agitation, confusion and decreased concentration  Current Outpatient Medications on File Prior to Visit   Medication Sig   • ascorbic acid (VITAMIN C) 1000 MG tablet Take 1 tablet (1,000 mg total) by mouth daily   • Cholecalciferol (Vitamin D3) 125 MCG (5000 UT) CAPS Take 1 capsule (5,000 Units total) by mouth daily   • levothyroxine 200 mcg tablet Take 200 mcg by mouth daily   • natalizumab (Tysabri) 300 mg/15 mL Infuse 300 mg into a venous catheter   • nicotine (NICODERM CQ) 14 mg/24hr TD 24 hr patch Place 1 patch on the skin every 24 hours   • spironolactone (ALDACTONE) 25 mg tablet Take 1 tablet (25 mg total) by mouth daily   • vitamin B-12 (VITAMIN B-12) 1,000 mcg tablet Take 2 tablets (2,000 mcg total) by mouth daily       Objective     /80 (BP Location: Right arm, Patient Position: Sitting, Cuff Size: Adult)   Pulse 88   Temp (!) 97 1 °F (36 2 °C) (Temporal)   Resp 18   Ht 5' 8" (1 727 m)   Wt 88 1 kg (194 lb 3 2 oz)   LMP  (LMP Unknown)   SpO2 97%   BMI 29 53 kg/m²     Physical Exam  Vitals and nursing note reviewed  Constitutional:       Appearance: Normal appearance  She is well-developed  She is not ill-appearing  HENT:      Head: Atraumatic  Eyes:      General:         Right eye: No discharge  Left eye: No discharge  Neck:      Thyroid: No thyromegaly  Cardiovascular:      Rate and Rhythm: Normal rate and regular rhythm  Pulses: Normal pulses  Heart sounds: Normal heart sounds  Pulmonary:      Effort: Pulmonary effort is normal       Breath sounds: Normal breath sounds  No wheezing  Chest:      Chest wall: No tenderness  Abdominal:      General: Bowel sounds are normal  There is no distension  Palpations: Abdomen is soft  There is no mass  Tenderness: There is no abdominal tenderness  Musculoskeletal:         General: No tenderness  Cervical back: Neck supple  Right lower leg: No edema  Left lower leg: No edema  Lymphadenopathy:      Cervical: No cervical adenopathy  Skin:     Findings: No erythema  Comments: Small dark spots around the fascial hair area  Neurological:      Mental Status: She is alert and oriented to person, place, and time     Psychiatric:         Mood and Affect: Mood normal          Behavior: Behavior normal          Judgment: Judgment normal        Paul Gonzalez MD

## 2023-03-16 ENCOUNTER — CONSULT (OUTPATIENT)
Dept: DERMATOLOGY | Facility: CLINIC | Age: 43
End: 2023-03-16

## 2023-03-16 VITALS — HEIGHT: 67 IN | BODY MASS INDEX: 30.45 KG/M2 | WEIGHT: 194 LBS | TEMPERATURE: 97.6 F

## 2023-03-16 DIAGNOSIS — L98.9 FACIAL SKIN LESION: ICD-10-CM

## 2023-03-16 NOTE — PATIENT INSTRUCTIONS
HYPERPIGMENTATION OF SKIN- UNDER CHIN     Assessment and Plan:  Based on a thorough discussion of this condition and the management approach to it (including a comprehensive discussion of the known risks, side effects and potential benefits of treatment), the patient (family) agrees to implement the following specific plan:  Discussed [facility]- mail order placed for medication   Discussed cosmetic visits with Dr Bon Mederos for IPL and laser visits to help with furthe treatment of hyperpigmentation

## 2023-03-16 NOTE — PROGRESS NOTES
[facility] Note     Patient Name: Ashley Palmer  Encounter Date: 3/16/23     Have you been cared for by a [place] [address] Dermatologist in the last 3 years and, if so, which description applies to you? NO  I am considered a "new" patient and must complete all patient intake questions  I am FEMALE/of child-bearing potential     REVIEW OF SYSTEMS:  Have you recently had or currently have any of the following? · Recent fever or chills? No  · Any non-healing wound? No  · Are you pregnant or planning to become pregnant? No  · Are you currently or planning to be nursing or breast feeding? No   PAST MEDICAL HISTORY:  Have you personally ever had or currently have any of the following? If "YES," then please provide more detail  · Skin cancer (such as Melanoma, Basal Cell Carcinoma, Squamous Cell Carcinoma? No  · Tuberculosis, HIV/AIDS, Hepatitis B or C: No  · Systemic Immunosuppression such as Diabetes, Biologic or Immunotherapy, Chemotherapy, Organ Transplantation, Bone Marrow Transplantation No  · Radiation Treatment No   FAMILY HISTORY:  Any "first degree relatives" (parent, brother, sister, or child) with the following? • Skin Cancer, Pancreatic or Other Cancer? No   PATIENT EXPERIENCE:    • Do you want the Dermatologist to perform a COMPLETE skin exam today including a clinical examination under the "bra and underwear" areas? Yes  • If necessary, do we have your permission to call and leave a detailed message on your Preferred Phone number that includes your specific medical information?   Yes      Allergies   Allergen Reactions   • Dimethyl Fumarate Itching   • Glatiramer Hives      Current Outpatient Medications:   •  ascorbic acid (VITAMIN C) 1000 MG tablet, Take 1 tablet (1,000 mg total) by mouth daily, Disp: 90 tablet, Rfl: 3  •  Cholecalciferol (Vitamin D3) 125 MCG (5000 UT) CAPS, Take 1 capsule (5,000 Units total) by mouth daily, Disp: 90 capsule, Rfl: 3  •  levothyroxine 200 mcg tablet, Take 200 mcg by mouth daily, Disp: , Rfl: 0  •  natalizumab (Tysabri) 300 mg/15 mL, Infuse 300 mg into a venous catheter, Disp: , Rfl:   •  nicotine (NICODERM CQ) 14 mg/24hr TD 24 hr patch, Place 1 patch on the skin every 24 hours, Disp: 28 patch, Rfl: 0  •  spironolactone (ALDACTONE) 25 mg tablet, Take 1 tablet (25 mg total) by mouth daily, Disp: 90 tablet, Rfl: 2  •  vitamin B-12 (VITAMIN B-12) 1,000 mcg tablet, Take 2 tablets (2,000 mcg total) by mouth daily, Disp: 180 tablet, Rfl: 3          • Whom besides the patient is providing clinical information about today's encounter?   o NO ADDITIONAL HISTORIAN (patient alone provided history)    Physical Exam and Assessment/Plan by Diagnosis:    HYPERPIGMENTATION OF SKIN- UNDER CHIN   Physical Exam:  • Anatomic Location Affected:  Under chin area  • Morphological Description:  Hyperpigmented patches of skin   • Pertinent Positives:  • Pertinent Negatives: Additional History of Present Condition:  Present for a few years, patient presents in office with hyperpigmented skin, states that she uses electric shaver to get rid of hairs     Assessment and Plan:  Based on a thorough discussion of this condition and the management approach to it (including a comprehensive discussion of the known risks, side effects and potential benefits of treatment), the patient (family) agrees to implement the following specific plan:  • Discussed [facility]- mail order placed for medication   • Discussed cosmetic visits with our cosmetic dermatologist to help with further and perhaps more aggressive treatment of hyperpigmentation if need be  [facility]     We use this service to prescribe medications that are often not covered by insurance  Your physician will send your prescription to the pharmacy  You will receive an email from [email or web address] where you will follow the instructions within the email to provide your billing and shipping information  Your medicine will be shipped directly to you  If you have any questions, you can call [phone] or email [email or web address]    Tretinoin: 0 025%  Niacinamide: 2%  Azelaic Acid: 8%  Sodium Hyaluronate: 0 25%  Vehicle: Cream     Scribe Attestation    I,:  Clare Robins am acting as a scribe while in the presence of the attending physician :       I,:  Merissa Bojorquez MD personally performed the services described in this documentation    as scribed in my presence :

## 2023-05-16 ENCOUNTER — HOSPITAL ENCOUNTER (EMERGENCY)
Facility: HOSPITAL | Age: 43
Discharge: HOME | End: 2023-05-16
Attending: EMERGENCY MEDICINE

## 2023-05-16 VITALS
TEMPERATURE: 97.9 F | HEART RATE: 78 BPM | RESPIRATION RATE: 16 BRPM | BODY MASS INDEX: 30.38 KG/M2 | SYSTOLIC BLOOD PRESSURE: 161 MMHG | OXYGEN SATURATION: 100 % | DIASTOLIC BLOOD PRESSURE: 92 MMHG | WEIGHT: 194 LBS

## 2023-05-16 DIAGNOSIS — H10.13 ALLERGIC CONJUNCTIVITIS OF BOTH EYES: Primary | ICD-10-CM

## 2023-05-16 RX ORDER — KETOTIFEN FUMARATE 0.35 MG/ML
1 SOLUTION/ DROPS OPHTHALMIC 2 TIMES DAILY
Qty: 5 ML | Refills: 0 | Status: SHIPPED | OUTPATIENT
Start: 2023-05-16

## 2023-05-16 NOTE — ED PROVIDER NOTES
"History  Chief Complaint   Patient presents with   • Facial Swelling     Seen at  yesterday for same  Reports bilateral eye \"swelling\" since waking up   Was given fexofenadine and olopatadine since yesterday without relief     Past Medical History: Asthma, Thyroid Cancer, Eczema, Headache- migraine, Hirsutism, Hyperthyroidism, Multiple sclerosis, Postoperative hypothyroidism,   Past Surgical History:  SECTION, Robotic laparoscopic hysterectomy bilateral salpingectomy, THYROIDECTOMY    Pt presents to ED c/o 3 day h/o irritation, redness, swelling to B/L eyes Left>right, slight itching, no other allergy sx, no fever, rash, nasal congestion; states typically does not get allergies  Pt states this am swelling to left eye worse and seems to be spreading to left temporal area  Pt seen at  yesterday for same, given Allegra and olopatadine, which she used yesterday but states no improvement; pt admits ot not using drops today  Prior to Admission Medications   Prescriptions Last Dose Informant Patient Reported? Taking?    Cholecalciferol (Vitamin D3) 125 MCG (5000 UT) CAPS   No No   Sig: Take 1 capsule (5,000 Units total) by mouth daily   ascorbic acid (VITAMIN C) 1000 MG tablet   No No   Sig: Take 1 tablet (1,000 mg total) by mouth daily   levothyroxine 200 mcg tablet   Yes No   Sig: Take 200 mcg by mouth daily   natalizumab (Tysabri) 300 mg/15 mL   Yes No   Sig: Infuse 300 mg into a venous catheter   nicotine (NICODERM CQ) 14 mg/24hr TD 24 hr patch   No No   Sig: Place 1 patch on the skin every 24 hours   spironolactone (ALDACTONE) 25 mg tablet   No No   Sig: Take 1 tablet (25 mg total) by mouth daily   vitamin B-12 (VITAMIN B-12) 1,000 mcg tablet   No No   Sig: Take 2 tablets (2,000 mcg total) by mouth daily   Patient not taking: Reported on 3/16/2023      Facility-Administered Medications: None       Past Medical History:   Diagnosis Date   • Asthma     no inhaler use    • Back problem    • " Cancer [facility])     THYROID CANCER   • Disease of thyroid gland    • Eczema    • Ex-smoker 10/20/2021   • Headache, migraine    • Hirsutism 2020   • Hx of thyroid cancer 10/20/2021    Papillary =dx 2016   • Hyperthyroidism    • Multiple sclerosis ([facility] [address] )    • Postoperative hypothyroidism 2023   • Thyroid cancer ([facility] [address] )    • Vitamin D deficiency 2020       Past Surgical History:   Procedure Laterality Date   •  SECTION      ,   and 3/23/2012    • COLONOSCOPY  2019    dr saunders   • EGD AND COLONOSCOPY  10/30/2019    antral gastritis=dr saunders   • HYSTERECTOMY  2019    Robotic laparoscopic hysterectomy bilateral salpingectomy   • THYROIDECTOMY  2016       Family History   Problem Relation Age of Onset   • Hypertension Maternal Grandmother    • Colon cancer Maternal Grandfather    • No Known Problems Mother    • Hepatitis Father    • No Known Problems Maternal Aunt    • No Known Problems Maternal Aunt    • No Known Problems Maternal Aunt      I have reviewed and agree with the history as documented  E-Cigarette/Vaping   • E-Cigarette Use Former User      E-Cigarette/Vaping Substances   • THC Yes    • CBD No    • Flavoring Yes      Social History     Tobacco Use   • Smoking status: Former     Packs/day: 0 00     Years: 15 00     Pack years: 0 00     Types: Cigarettes     Quit date: 10/12/2021     Years since quittin 5   • Smokeless tobacco: Never   • Tobacco comments:     Current some day smoker, 1 PPW - As per [employer] [place] [employer]   • Vaping Use: Former   • Substances: THC, Flavoring   Substance Use Topics   • Alcohol use: Yes     Comment: Alcohol intake:   Occasional  - As per [place]    • Drug use: Yes     Types: Marijuana       Review of Systems   Constitutional: Negative for fever  HENT: Positive for facial swelling  Negative for congestion, ear discharge, rhinorrhea, sinus pressure, sinus pain, sneezing, sore throat and trouble swallowing      Eyes: Positive for discharge, redness and itching  Negative for photophobia and visual disturbance  Respiratory: Negative for shortness of breath  Cardiovascular: Negative for chest pain  Gastrointestinal: Negative for vomiting  Skin: Negative for rash  Neurological: Negative for headaches  All other systems reviewed and are negative  Physical Exam  Physical Exam  Vitals and nursing note reviewed  Constitutional:       General: She is in acute distress (mild)  Appearance: She is well-developed  HENT:      Head: Normocephalic and atraumatic  Right Ear: External ear normal       Left Ear: External ear normal       Nose: Nose normal       Mouth/Throat:      Mouth: Mucous membranes are moist       Pharynx: Oropharynx is clear  Eyes:      General: Vision grossly intact  Extraocular Movements: Extraocular movements intact  Conjunctiva/sclera:      Right eye: Right conjunctiva is injected  No exudate  Left eye: Left conjunctiva is injected  Chemosis present  No exudate  Pupils: Pupils are equal, round, and reactive to light  Comments: Mild swelling to upper and lower eyelids Left>right   Cardiovascular:      Rate and Rhythm: Normal rate and regular rhythm  Pulmonary:      Effort: Pulmonary effort is normal       Breath sounds: Normal breath sounds  Abdominal:      General: Bowel sounds are normal       Palpations: Abdomen is soft  Musculoskeletal:         General: Normal range of motion  Cervical back: Normal range of motion  Skin:     General: Skin is warm and dry  Neurological:      Mental Status: She is alert and oriented to person, place, and time     Psychiatric:         Behavior: Behavior normal          Vital Signs  ED Triage Vitals [05/16/23 0822]   Temperature Pulse Respirations Blood Pressure SpO2   97 9 °F (36 6 °C) 78 16 161/92 100 %      Temp Source Heart Rate Source Patient Position - Orthostatic VS BP Location FiO2 (%)   Oral Monitor Sitting Left arm -- Pain Score       No Pain           Vitals:    05/16/23 0822   BP: 161/92   Pulse: 78   Patient Position - Orthostatic VS: Sitting         Visual Acuity      ED Medications  Medications - No data to display    Diagnostic Studies  Results Reviewed     None                 No orders to display              Procedures  Procedures         ED Course                               SBIRT 20yo+    Flowsheet Row Most Recent Value   Initial Alcohol Screen: US AUDIT-C     1  How often do you have a drink containing alcohol? 2 Filed at: 05/16/2023 0821   2  How many drinks containing alcohol do you have on a typical day you are drinking? 2 Filed at: 05/16/2023 0821   3b  FEMALE Any Age, or MALE 65+: How often do you have 4 or more drinks on one occassion? 0 Filed at: 05/16/2023 9399   Audit-C Score 4 Filed at: 05/16/2023 5928   VETO: How many times in the past year have you    Used an illegal drug or used a prescription medication for non-medical reasons? Never Filed at: 05/16/2023 5475                    Medical Decision Making  Pt with clinical allergic conjunctivitis; to continue antihistamine, olopatadine drops, will add Ketotifen drops, ice to area, avoid allergen, no visual changes, FU with pcp as needed    Amount and/or Complexity of Data Reviewed  External Data Reviewed: notes  Risk  OTC drugs  Prescription drug management  Disposition  Final diagnoses: Allergic conjunctivitis of both eyes     Time reflects when diagnosis was documented in both MDM as applicable and the Disposition within this note     Time User Action Codes Description Comment    5/16/2023  8:34 AM Eboyn Henry Add [H10 13] Allergic conjunctivitis of both eyes       ED Disposition     ED Disposition   Discharge    Condition   Stable    Date/Time   Tue May 16, 2023  8:34 AM    Comment   Maria Gamez discharge to home/self care                 Follow-up Information     Follow up With Specialties Details Why Contact Info Shannan Elizabeth MD Internal Medicine  As needed [address]  [facility] [place], [place] [address] [place] [address]   [phone]            Discharge Medication List as of 5/16/2023  8:49 AM      START taking these medications    Details   ketotifen (ZADITOR) 0 025 % ophthalmic solution Administer 1 drop to both eyes 2 (two) times a day, Starting Tue 5/16/2023, Normal         CONTINUE these medications which have NOT CHANGED    Details   ascorbic acid (VITAMIN C) 1000 MG tablet Take 1 tablet (1,000 mg total) by mouth daily, Starting Thu 2/2/2023, Normal      Cholecalciferol (Vitamin D3) 125 MCG (5000 UT) CAPS Take 1 capsule (5,000 Units total) by mouth daily, Starting Thu 2/2/2023, Until Wed 5/3/2023, Normal      levothyroxine 200 mcg tablet Take 200 mcg by mouth daily, Starting Wed 12/19/2018, Historical Med      natalizumab (Tysabri) 300 mg/15 mL Infuse 300 mg into a venous catheter, Historical Med      nicotine (NICODERM CQ) 14 mg/24hr TD 24 hr patch Place 1 patch on the skin every 24 hours, Starting Wed 1/11/2023, Normal      spironolactone (ALDACTONE) 25 mg tablet Take 1 tablet (25 mg total) by mouth daily, Starting Wed 1/11/2023, Normal      vitamin B-12 (VITAMIN B-12) 1,000 mcg tablet Take 2 tablets (2,000 mcg total) by mouth daily, Starting Thu 2/2/2023, Until Wed 5/3/2023, Normal             No discharge procedures on file      PDMP Review     None          ED Provider  Electronically Signed by           Treasure Ferreira PA-C  05/16/23 4067

## 2023-05-16 NOTE — DISCHARGE INSTRUCTIONS
You can use over-the-counter antihistamines like Claritin, Zyrtec, Allegra to help with swelling, allergies  Ice to area to help with swelling, irritation  Avoid known allergens  Use your olopatadine eye drops 1 drop every 8 hrs while awake (that is the one you already have)  Add Ketotifen eye drops as directed  If no improvement follow-up with your doctor in next few days

## 2023-05-17 ENCOUNTER — OFFICE VISIT (OUTPATIENT)
Dept: FAMILY MEDICINE CLINIC | Facility: CLINIC | Age: 43
End: 2023-05-17

## 2023-05-17 VITALS
HEIGHT: 67 IN | WEIGHT: 191 LBS | RESPIRATION RATE: 16 BRPM | DIASTOLIC BLOOD PRESSURE: 86 MMHG | SYSTOLIC BLOOD PRESSURE: 138 MMHG | TEMPERATURE: 98.1 F | OXYGEN SATURATION: 98 % | BODY MASS INDEX: 29.98 KG/M2 | HEART RATE: 76 BPM

## 2023-05-17 DIAGNOSIS — R22.0 LEFT FACIAL SWELLING: ICD-10-CM

## 2023-05-17 DIAGNOSIS — H10.33 ACUTE BACTERIAL CONJUNCTIVITIS OF BOTH EYES: Primary | ICD-10-CM

## 2023-05-17 DIAGNOSIS — E66.3 OVERWEIGHT WITH BODY MASS INDEX (BMI) OF 29 TO 29.9 IN ADULT: ICD-10-CM

## 2023-05-17 DIAGNOSIS — L08.9: ICD-10-CM
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
## 2023-05-17 DIAGNOSIS — T78.40XD ALLERGIC REACTION, SUBSEQUENT ENCOUNTER: ICD-10-CM

## 2023-05-17 DIAGNOSIS — Z72.0 TOBACCO USE: ICD-10-CM

## 2023-05-17 DIAGNOSIS — S00.86XD: ICD-10-CM

## 2023-05-17 PROBLEM — E66.09 CLASS 1 OBESITY DUE TO EXCESS CALORIES WITH SERIOUS COMORBIDITY AND BODY MASS INDEX (BMI) OF 30.0 TO 30.9 IN ADULT: Status: ACTIVE | Noted: 2023-05-17

## 2023-05-17 PROBLEM — T63.301A SPIDER BITE: Status: ACTIVE | Noted: 2023-05-17

## 2023-05-17 PROBLEM — T78.40XA ALLERGIC REACTION: Status: ACTIVE | Noted: 2023-05-17

## 2023-05-17 RX ORDER — METHYLPREDNISOLONE SODIUM SUCCINATE 40 MG/ML
40 INJECTION, POWDER, LYOPHILIZED, FOR SOLUTION INTRAMUSCULAR; INTRAVENOUS ONCE
Status: COMPLETED | OUTPATIENT
Start: 2023-05-17 | End: 2023-05-17

## 2023-05-17 RX ORDER — METHYLPREDNISOLONE SODIUM SUCCINATE 40 MG/ML
40 INJECTION, POWDER, LYOPHILIZED, FOR SOLUTION INTRAMUSCULAR; INTRAVENOUS ONCE
Status: DISCONTINUED | OUTPATIENT
Start: 2023-05-17 | End: 2023-05-17

## 2023-05-17 RX ORDER — LEVOTHYROXINE SODIUM 112 UG/1
TABLET ORAL
COMMUNITY
Start: 2023-04-27

## 2023-05-17 RX ORDER — FEXOFENADINE HYDROCHLORIDE 180 MG/1
TABLET, FILM COATED ORAL
COMMUNITY
Start: 2023-05-15

## 2023-05-17 RX ORDER — OLOPATADINE HYDROCHLORIDE 2 MG/ML
1 SOLUTION/ DROPS OPHTHALMIC DAILY
COMMUNITY
Start: 2023-05-15

## 2023-05-17 RX ORDER — NICOTINE 21 MG/24HR
1 PATCH, TRANSDERMAL 24 HOURS TRANSDERMAL EVERY 24 HOURS
Qty: 28 PATCH | Refills: 0 | Status: SHIPPED | OUTPATIENT
Start: 2023-05-17

## 2023-05-17 RX ORDER — AMOXICILLIN AND CLAVULANATE POTASSIUM 875; 125 MG/1; MG/1
1 TABLET, FILM COATED ORAL EVERY 12 HOURS SCHEDULED
Qty: 20 TABLET | Refills: 0 | Status: SHIPPED | OUTPATIENT
Start: 2023-05-17 | End: 2023-05-27

## 2023-05-17 RX ORDER — PREDNISONE 20 MG/1
20 TABLET ORAL DAILY
Qty: 5 TABLET | Refills: 0 | Status: SHIPPED | OUTPATIENT
Start: 2023-05-17 | End: 2023-05-22

## 2023-05-17 RX ORDER — PREDNISONE 20 MG/1
20 TABLET ORAL DAILY
Qty: 5 TABLET | Refills: 0 | Status: SHIPPED | OUTPATIENT
Start: 2023-05-17 | End: 2023-05-17

## 2023-05-17 RX ADMIN — METHYLPREDNISOLONE SODIUM SUCCINATE 40 MG: 40 INJECTION, POWDER, LYOPHILIZED, FOR SOLUTION INTRAMUSCULAR; INTRAVENOUS at 14:30

## 2023-05-17 RX ADMIN — METHYLPREDNISOLONE SODIUM SUCCINATE 40 MG: 40 INJECTION, POWDER, LYOPHILIZED, FOR SOLUTION INTRAMUSCULAR; INTRAVENOUS at 14:29

## 2023-05-17 NOTE — ASSESSMENT & PLAN NOTE
Patient placed on prednisone 20 mg p o  daily for 5 days  Ordered for Solu-Medrol 80 mg IM  Patient also to take Benadryl 50 mg every 6 hours as needed for any itching, swelling or redness  Patient ordered for [facility] panel for allergy testing and food allergy panel

## 2023-05-17 NOTE — PROGRESS NOTES
BMI Counseling: Body mass index is 29 91 kg/m²  The BMI is above normal  Nutrition recommendations include decreasing portion sizes, encouraging healthy choices of fruits and vegetables, decreasing fast food intake, consuming healthier snacks, limiting drinks that contain sugar, moderation in carbohydrate intake, increasing intake of lean protein, reducing intake of saturated and trans fat and reducing intake of cholesterol  Exercise recommendations include vigorous physical activity 75 minutes/week, exercising 3-5 times per week, obtaining a gym membership and strength training exercises  No pharmacotherapy was ordered  Rationale for BMI follow-up plan is due to patient being overweight or obese  Depression Screening and Follow-up Plan: Patient was screened for depression during today's encounter  They screened negative with a PHQ-2 score of 0  Assessment/Plan:         Problem List Items Addressed This Visit        Musculoskeletal and Integument    Bug bite of face with infection, subsequent encounter     Placed on systemic amoxicillin/clavinate 875-125 mg p o  twice daily x10 days  Relevant Medications    amoxicillin-clavulanate (Augmentin) 875-125 mg per tablet       Other    Tobacco use     Patient smokes 3-4 cigarettes daily  Ordered for 21 mg nicotine patch  Relevant Medications    nicotine (NICODERM CQ) 21 mg/24 hr TD 24 hr patch    Overweight with body mass index (BMI) of 29 to 29 9 in adult     BMI currently 29 91 kg/M2  Patient counseled on proper diet exercise nutrition goal BMI 25 kg/M2  Allergic reaction     Patient placed on prednisone 20 mg p o  daily for 5 days  Ordered for Solu-Medrol 80 mg IM  Patient also to take Benadryl 50 mg every 6 hours as needed for any itching, swelling or redness  Patient ordered for [address] [place] [address] panel for allergy testing and food allergy panel           Relevant Medications    predniSONE 20 mg tablet    methylPREDNISolone sodium succinate (Solu-MEDROL) injection 40 mg (Completed)    methylPREDNISolone sodium succinate (Solu-MEDROL) injection 40 mg (Completed)    Other Relevant Orders    Food Allergy Profile    [facility] Panel, Adult    Left facial swelling     Patient placed on prednisone 20 mg p o  daily for 5 days  Ordered for Solu-Medrol 80 mg IM in office  Relevant Medications    predniSONE 20 mg tablet    Acute bacterial conjunctivitis of both eyes - Primary     Placed on systemic amoxicillin/clavinate 875-125 mg p o  twice daily x10 days  Relevant Medications    amoxicillin-clavulanate (Augmentin) 875-125 mg per tablet    olopatadine HCl (PATADAY) 0 2 % opth drops         Subjective:      Patient ID: Emma Berrios is a 40 y o  female  Patient is a 30-year-old female present for evaluation of possible bug bite  Indicates she was at a hotel on mothers days and noted that when she woke up the next morning she had swelling of her left side her face, reddened, watery eyes with tearing and burning  The following portions of the patient's history were reviewed and updated as appropriate:   Past Medical History:  She has a past medical history of Asthma, Back problem, Cancer ([facility] [address] ), Disease of thyroid gland, Eczema, Ex-smoker (10/20/2021), Headache, migraine, Hirsutism (2020), thyroid cancer (10/20/2021), Hyperthyroidism, Multiple sclerosis ([facility] [address] ), Postoperative hypothyroidism (2023), Thyroid cancer ([facility] ), and Vitamin D deficiency (2020)  ,  _______________________________________________________________________  Medical Problems:  does not have any pertinent problems on file ,  _______________________________________________________________________  Past Surgical History:   has a past surgical history that includes Thyroidectomy (2016);  section; Hysterectomy (2019);  Colonoscopy (2019); and EGD AND COLONOSCOPY (10/30/2019)  ,  _______________________________________________________________________  Family History:  family history includes Colon cancer in her maternal grandfather; Hepatitis in her father; Hypertension in her maternal grandmother; No Known Problems in her maternal aunt, maternal aunt, maternal aunt, and mother ,  _______________________________________________________________________  Social History:   reports that she quit smoking about 19 months ago  Her smoking use included cigarettes  She has never used smokeless tobacco  She reports current alcohol use  She reports current drug use  Drug: Marijuana  ,  _______________________________________________________________________  Allergies:  is allergic to dimethyl fumarate and glatiramer     _______________________________________________________________________  Current Outpatient Medications   Medication Sig Dispense Refill   • Allergy Relief 180 MG tablet      • amoxicillin-clavulanate (Augmentin) 875-125 mg per tablet Take 1 tablet by mouth every 12 (twelve) hours for 10 days 20 tablet 0   • ascorbic acid (VITAMIN C) 1000 MG tablet Take 1 tablet (1,000 mg total) by mouth daily 90 tablet 3   • Cholecalciferol (Vitamin D3) 125 MCG (5000 UT) CAPS Take 1 capsule (5,000 Units total) by mouth daily 90 capsule 3   • ketotifen (ZADITOR) 0 025 % ophthalmic solution Administer 1 drop to both eyes 2 (two) times a day 5 mL 0   • levothyroxine 112 mcg tablet TAKE 2 TABLETS (224 MCG TOTAL) BY MOUTH EVERY MORNING  BRAND SYNTHROID   DOSE ADJUSTED 9/2/2022     • natalizumab (Tysabri) 300 mg/15 mL Infuse 300 mg into a venous catheter     • nicotine (NICODERM CQ) 14 mg/24hr TD 24 hr patch Place 1 patch on the skin every 24 hours 28 patch 0   • nicotine (NICODERM CQ) 21 mg/24 hr TD 24 hr patch Place 1 patch on the skin over 24 hours every 24 hours 28 patch 0   • olopatadine HCl (PATADAY) 0 2 % opth drops Apply 1 drop to eye daily     • predniSONE 20 mg tablet Take 1 tablet (20 mg total) by mouth daily for 5 days 5 tablet 0   • spironolactone (ALDACTONE) 25 mg tablet Take 1 tablet (25 mg total) by mouth daily 90 tablet 2   • levothyroxine 200 mcg tablet Take 200 mcg by mouth daily (Patient not taking: Reported on 5/17/2023)  0   • vitamin B-12 (VITAMIN B-12) 1,000 mcg tablet Take 2 tablets (2,000 mcg total) by mouth daily (Patient not taking: Reported on 3/16/2023) 180 tablet 3     No current facility-administered medications for this visit      _______________________________________________________________________  Review of Systems   Constitutional: Negative for activity change, appetite change, chills, fatigue, fever and unexpected weight change  HENT: Positive for congestion, rhinorrhea and sinus pressure  Negative for ear discharge, ear pain, nosebleeds, postnasal drip, sinus pain, sneezing, sore throat and voice change  Swelling of the left side of her face  Patient does use hair product on her hair in that region  Eyes: Positive for redness  Negative for pain, itching and visual disturbance  Burning eyes, with redness, swelling and tearing  Respiratory: Negative for cough, chest tightness, shortness of breath and wheezing  Cardiovascular: Negative for chest pain and palpitations  Gastrointestinal: Negative for abdominal distention, abdominal pain, constipation, diarrhea, nausea and vomiting  Endocrine: Negative  Genitourinary: Negative for difficulty urinating, dysuria, flank pain, frequency, hematuria and urgency  Musculoskeletal: Negative for arthralgias and myalgias  Skin: Negative for wound  Allergic/Immunologic: Negative  Neurological: Negative  Hematological: Negative  Psychiatric/Behavioral: Negative            Objective:  Vitals:    05/17/23 1344   BP: 138/86   BP Location: Left arm   Patient Position: Sitting   Cuff Size: Standard   Pulse: 76   Resp: 16   Temp: 98 1 °F (36 7 °C)   TempSrc: Temporal   SpO2: 98%   Weight: 86 6 kg "(191 lb)   Height: 5' 7\" (1 702 m)     Body mass index is 29 91 kg/m²  Physical Exam  Vitals and nursing note reviewed  Constitutional:       General: She is not in acute distress  Appearance: Normal appearance  She is well-developed  She is obese  She is not ill-appearing  HENT:      Head: Normocephalic and atraumatic  Comments: Minimal cerumen bilateral ears  Swelling of the left temporal region of soft tissue  Right Ear: Tympanic membrane, ear canal and external ear normal  There is no impacted cerumen  Left Ear: Tympanic membrane, ear canal and external ear normal  There is no impacted cerumen  Ears:      Comments: Mild cerumen bilateral ears  Nose: Congestion and rhinorrhea present  Mouth/Throat:      Mouth: Mucous membranes are moist       Pharynx: No oropharyngeal exudate or posterior oropharyngeal erythema  Eyes:      General:         Right eye: No discharge  Left eye: No discharge  Extraocular Movements: Extraocular movements intact  Pupils: Pupils are equal, round, and reactive to light  Comments: bilateral eyes, redness, swelling and infused  Cardiovascular:      Rate and Rhythm: Normal rate and regular rhythm  Pulses: Normal pulses  Heart sounds: Normal heart sounds  No murmur heard  Pulmonary:      Effort: Pulmonary effort is normal       Breath sounds: Normal breath sounds  Abdominal:      General: Bowel sounds are normal       Palpations: Abdomen is soft  Musculoskeletal:         General: Normal range of motion  Cervical back: Normal range of motion and neck supple  Skin:     General: Skin is warm  Capillary Refill: Capillary refill takes less than 2 seconds  Neurological:      General: No focal deficit present  Mental Status: She is alert and oriented to person, place, and time     Psychiatric:         Mood and Affect: Mood normal          Behavior: Behavior normal          "

## 2023-05-17 NOTE — ASSESSMENT & PLAN NOTE
Patient placed on prednisone 20 mg p o  daily for 5 days  Ordered for Solu-Medrol 80 mg IM in office

## 2023-05-17 NOTE — PATIENT INSTRUCTIONS
Insect Bite or Sting   WHAT YOU NEED TO KNOW:   What do I need to know about an insect bite or sting? Most insect bites and stings are not dangerous and go away without treatment  Common examples of insects that bite or sting are bees, ticks, mosquitoes, spiders, and ants  Insect bites or stings can lead to diseases such as malaria, West Nile virus, Lyme disease, or [place] Spotted Fever  What are the signs and symptoms of an allergic reaction to an insect bite or sting? Mild symptoms  include a red bump, pain, swelling, and itching  Anaphylaxis symptoms  include throat tightness, trouble breathing, tingling, dizziness, and wheezing  Anaphylaxis is a sudden, life-threatening reaction that needs immediate treatment  How is an allergic reaction to an insect bite or sting treated? Treatment depends on how severe your symptoms are and if you had anaphylaxis before  You may need any of the following:  Antihistamines  decrease mild symptoms such as itching and rash  Epinephrine  is medicine used to treat severe allergic reactions such as anaphylaxis  A tetanus shot  may be given  The shot is a vaccine that helps prevent a bacterial infection  Tetanus booster shots are usually given every 10 years  What steps do I need to take for signs or symptoms of anaphylaxis? Immediately  give 1 shot of epinephrine only into the outer thigh muscle  Leave the shot in place  as directed  Your healthcare provider may recommend you leave it in place for up to 10 seconds before you remove it  This helps make sure all of the epinephrine is delivered  Call 911 and go to the emergency department,  even if the shot improved symptoms  Do not drive yourself  Bring the used epinephrine shot with you  What should I do if an insect bites or stings me? Remove the stinger  Scrape the stinger out with your fingernail, edge of a credit card, or a knife blade  Do not squeeze the wound   Gently wash the area with soap and water  Remove the tick  Ticks must be removed as soon as possible so you do not get diseases passed through tick bites  Ask your healthcare provider for more information on tick bites and how to remove ticks  What can I do to care for my bite or sting wound? Elevate (raise) the area above the level of your heart, if possible  Prop the area on pillows to keep it raised comfortably  Elevate the area for 10 to 20 minutes each hour or as directed by your healthcare provider  Apply compresses  Soak a clean washcloth in cold water, wring it out, and put it on the bite or sting  Use the compress for 10 to 20 minutes each hour or as directed by your healthcare provider  After 24 to 48 hours, change to warm compresses  Apply a baking soda paste  Add water to baking soda to make a thick paste  Put the paste on the area for 5 minutes  Rinse gently to remove the paste  What safety precautions do I need to take if I am at risk for anaphylaxis? Keep 2 shots of epinephrine with you at all times  You may need a second shot, because epinephrine only works for about 20 minutes and symptoms may return  Your healthcare provider can show you and family members how to give the shot  Check the expiration date every month and replace it before it expires  Create an action plan  Your healthcare provider can help you create a written plan that explains the allergy and an emergency plan to treat a reaction  The plan explains when to give a second epinephrine shot if symptoms return or do not improve after the first  Give copies of the action plan and emergency instructions to family members, work and school staff, and  providers  Show them how to give a shot of epinephrine  Carry medical alert identification  Wear medical alert jewelry or carry a card that says you have an insect allergy  Ask your healthcare provider where to get these items         What can I do to prevent an insect bite or sting? Do not wear bright-colored or flower-print clothing when you plan to spend time outdoors  Do not use hairspray, perfumes, or aftershave  Do not leave food out  Empty any standing water  Wash containers with soap and water every 2 days  Put screens on all open windows and doors  Put insect repellent that contains DEET on skin that is showing when you go outside  Put insect repellent at the top of your boots, bottom of pant legs, and sleeve cuffs  Wear long sleeves, pants, and shoes  Use citronella candles outdoors to help keep mosquitoes away  Put a tick and flea collar on pets  Call your local emergency number (911 in the [address]) for signs or symptoms of anaphylaxis,  such as trouble breathing, swelling in your mouth or throat, or wheezing  You may also have itching, a rash, hives, or feel like you are going to faint  When should I seek immediate care? You are stung on your tongue or in your throat  A white area forms around the bite  You are sweating badly or have body pain  You think you were bitten or stung by a poisonous insect  When should I call my doctor? You have a fever  The area becomes red, warm, tender, and swollen beyond the area of the bite or sting  You have questions or concerns about your condition or care  CARE AGREEMENT:   You have the right to help plan your care  Learn about your health condition and how it may be treated  Discuss treatment options with your healthcare providers to decide what care you want to receive  You always have the right to refuse treatment  The above information is an  only  It is not intended as medical advice for individual conditions or treatments  Talk to your doctor, nurse or pharmacist before following any medical regimen to see if it is safe and effective for you    © Copyright [employer] 2022 Information is for End User's use only and may not be sold, redistributed or otherwise used for commercial purposes  Allergies   WHAT YOU NEED TO KNOW:   What are allergies? Allergies are an immune system reaction to a substance called an allergen  Your immune system sees the allergen as harmful and attacks it  What causes allergies? You may have allergies at certain times of the year or all year  The following are common allergies:  Seasonal airborne allergies  happen during certain times of the year  This is also called hay fever  Tree, weed, or grass pollen are examples of allergens that you breathe in  Environmental airborne allergy  triggers you may breathe in year-round include dust, mold, and pet hair  Contact allergies  include latex, found in items such as condoms and medical gloves  Latex allergies can be very serious  Insect sting allergies  may be caused by bees, hornets, fire ants, or other insects that sting or bite you  Insect allergies can be very serious  Food allergies  commonly include shellfish, wheat, and eggs  Some foods must be eaten to produce an allergic reaction  Other foods can trigger a reaction if they touch your skin or are breathed in  What increases my risk for allergies? Allergic reactions can happen at any time, even if you have not had allergies before  You may develop an allergy after you have been exposed to an allergen more than once  Allergies are most common in children and elderly people, but anyone can have an allergic reaction  Your risk is also increased if you have a family history of allergies or a medical condition such as asthma  What are the signs and symptoms of allergies? Mild symptoms  include sneezing and a runny, itchy, or stuffy nose  You may also have swollen, watery, or itchy eyes, or skin itching  You may have swelling or pain where an insect bit or stung you  Anaphylaxis symptoms  include trouble breathing or swallowing, a rash or hives, or severe swelling  You may also have a cough, wheezing, or feel lightheaded or dizzy  Anaphylaxis is a sudden, life-threatening reaction that needs immediate treatment  How are allergies diagnosed? Your healthcare provider will ask about your signs and symptoms  The provider will ask which allergens you have been exposed to and if you have ever had other allergic reactions  The provider may look in your nose, ears, or throat  You may need additional testing if you developed anaphylaxis after you were exposed to a trigger and then exercised  This is called exercise-induced anaphylaxis  You may also need the following tests:  Blood tests  are used to check for signs of a reaction to allergens  Nasal tests  are used to see how your nasal passages react to allergens  A sample of your nasal fluid may also be tested  Skin tests  can help your healthcare provider find what you are allergic to  A small amount of allergen will be placed on your arm or back  Your skin will then be pricked with a needle  Your provider will watch how your skin reacts to the allergen  How are allergies treated? Antihistamines  help decrease itching, sneezing, and swelling  You may take them as a pill or use drops in your nose or eyes  Decongestants  help your nose feel less stuffy  Steroids  decrease swelling and redness  Topical treatments  help decrease itching or swelling  You also may be given nasal sprays or eyedrops  Epinephrine  is medicine used to treat severe allergic reactions such as anaphylaxis  Desensitization  gets your body used to allergens you cannot avoid  Your healthcare provider will give you a shot that contains a small amount of an allergen  Any allergic reaction you have will be treated  Your provider will give you more of the allergen a little at a time until your body gets used to it  Your reaction to the allergen may be less serious after this treatment  Your provider will tell you how long to get the shots      What steps do I need to take for signs or symptoms of anaphylaxis? Immediately  give 1 shot of epinephrine only into the outer thigh muscle  Leave the shot in place  as directed  Your healthcare provider may recommend you leave it in place for up to 10 seconds before you remove it  This helps make sure all of the epinephrine is delivered  Call 911 and go to the emergency department,  even if the shot improved symptoms  Do not drive yourself  Bring the used epinephrine shot with you  What safety precautions do I need to take if I am at risk for anaphylaxis? Keep 2 shots of epinephrine with you at all times  You may need a second shot, because epinephrine only works for about 20 minutes and symptoms may return  Your healthcare provider can show you and family members how to give the shot  Check the expiration date every month and replace it before it expires  Create an action plan  Your healthcare provider can help you create a written plan that explains the allergy and an emergency plan to treat a reaction  The plan explains when to give a second epinephrine shot if symptoms return or do not improve after the first  Give copies of the action plan and emergency instructions to family members and work staff  Show them how to give a shot of epinephrine  Be careful when you exercise  If you have had exercise-induced anaphylaxis, do not exercise right after you eat  Stop exercising right away if you start to develop any signs or symptoms of anaphylaxis  You may first feel tired, warm, or have itchy skin  Hives, swelling, and severe breathing problems may develop if you continue to exercise  Carry medical alert identification  Wear medical alert jewelry or carry a card that explains the allergy  Ask your healthcare provider where to get these items  Inform all healthcare providers of the allergy  This includes dentists, nurses, doctors, and surgeons  How can I manage allergies? Use nasal rinses as directed    Rinse with a saline solution daily  This will help clear allergens out of your nose  Use distilled water if possible  You can also boil tap water and let it cool before you use it  Do not use tap water that has not been boiled  Do not smoke  Allergy symptoms may decrease if you are not around smoke  Nicotine and other chemicals in cigarettes and cigars can cause lung damage  Ask your healthcare provider for information if you currently smoke and need help to quit  E-cigarettes or smokeless tobacco still contain nicotine  Talk to your healthcare provider before you use these products  How can I prevent an allergic reaction? Do not go outside when pollen counts are high if you have seasonal allergies  Your symptoms may be better if you go outside only in the morning or evening  Use your air conditioner, and change air filters often  Avoid dust, fur, and mold  Dust and vacuum your home often  You may want to wear a mask when you vacuum  Keep pets in certain rooms, and bathe them often  Use a dehumidifier (machine that decreases moisture) to help prevent mold  Do not use products that contain latex if you have a latex allergy  Use nonlatex gloves if you work in healthcare or in food preparation  Always tell healthcare providers about a latex allergy  Avoid areas that attract insects if you have an insect bite or sting allergy  Areas include trash cans, gardens, and picnics  Do not wear bright clothing or strong scents when you will be outside  Prevent an allergic reaction caused by food  You may have a reaction if your food is not prepared safely  For example, you could be served food that touched your trigger food during preparation  This is called cross-contamination  Kitchen tools can also cause cross-contamination  You may also eat baked foods that contain a trigger food you do not know about  Ask if the food contains your trigger food before you handle or eat it      Call 911 for signs or symptoms of anaphylaxis,  such as trouble breathing, swelling in your mouth or throat, or wheezing  You may also have itching, a rash, hives, or feel like you are going to faint  When should I seek immediate care? You have tingling in your hands or feet  Your skin is red or flushed  When should I contact my healthcare provider? You have questions or concerns about your condition or care  CARE AGREEMENT:   You have the right to help plan your care  Learn about your health condition and how it may be treated  Discuss treatment options with your healthcare providers to decide what care you want to receive  You always have the right to refuse treatment  The above information is an  only  It is not intended as medical advice for individual conditions or treatments  Talk to your doctor, nurse or pharmacist before following any medical regimen to see if it is safe and effective for you  © Copyright [employer] 2022 Information is for End User's use only and may not be sold, redistributed or otherwise used for commercial purposes  General Allergic Reaction   WHAT YOU NEED TO KNOW:   An allergic reaction is your body's response to an allergen  Allergens include medicines, food, insect stings, animal dander, mold, latex, chemicals, and dust mites  Pollen from trees, grass, and weeds can also cause an allergic reaction  An allergic reaction can range from mild to severe  DISCHARGE INSTRUCTIONS:   Call 911 for signs or symptoms of anaphylaxis,  such as trouble breathing, swelling in your mouth or throat, or wheezing  You may also have itching, a rash, hives, or feel like you are going to faint  Return to the emergency department if:   You have a skin rash, hives, swelling, or itching that is starting to get worse  Your throat tightens, or your lips or tongue swell  You have trouble swallowing or speaking  You have worsening nausea, diarrhea, or abdominal cramps, or you are vomiting      You have chest pain or tightness  Contact your healthcare provider if:   You have questions or concerns about your condition or care  Medicines: You may need any of the following:  Medicines  may be given to relieve certain allergy symptoms such as itching, sneezing, and swelling  You may take them as a pill or use drops in your nose or eyes  Topical treatments may be given to put directly on your skin to help decrease itching or swelling  Epinephrine  may be prescribed if you are at risk for anaphylaxis  This is a severe allergic reaction that can be life-threatening  Your healthcare provider will tell you if you need to keep epinephrine with you  You will be taught when and how to use it  Take your medicine as directed  Contact your healthcare provider if you think your medicine is not helping or if you have side effects  Tell your provider if you are allergic to any medicine  Keep a list of the medicines, vitamins, and herbs you take  Include the amounts, and when and why you take them  Bring the list or the pill bottles to follow-up visits  Carry your medicine list with you in case of an emergency  Follow up with your doctor as directed:  Write down your questions so you remember to ask them during your visits  Manage your symptoms:   Avoid allergens  You may need to have allergy testing with your healthcare provider or a specialist to find your allergens  Use cold compresses on your skin or eyes  This will help soothe skin or eyes affected by the allergic reaction  You can make a cold compress by soaking a washcloth in cool water  Wring out the extra water before you apply the washcloth  Rinse your nasal passages with a saline solution  Daily rinsing may help clear allergens out of your nose  Use distilled water if possible  You can also boil tap water and then let it cool before you use it  Do not use tap water without boiling it first     Do not smoke    Nicotine and other chemicals in cigarettes and cigars can make an allergic reaction worse, and can also cause lung damage  Ask your healthcare provider for information if you currently smoke and need help to quit  E-cigarettes or smokeless tobacco still contain nicotine  Talk to your healthcare provider before you use these products  © Copyright [employer] 2022 Information is for End User's use only and may not be sold, redistributed or otherwise used for commercial purposes  The above information is an  only  It is not intended as medical advice for individual conditions or treatments  Talk to your doctor, nurse or pharmacist before following any medical regimen to see if it is safe and effective for you

## 2023-05-20 ENCOUNTER — HOSPITAL ENCOUNTER (EMERGENCY)
Facility: HOSPITAL | Age: 43
Discharge: HOME | End: 2023-05-20
Attending: EMERGENCY MEDICINE

## 2023-05-20 ENCOUNTER — APPOINTMENT (EMERGENCY)
Dept: CT IMAGING | Facility: HOSPITAL | Age: 43
End: 2023-05-20

## 2023-05-20 ENCOUNTER — APPOINTMENT (OUTPATIENT)
Dept: LAB | Facility: HOSPITAL | Age: 43
End: 2023-05-20

## 2023-05-20 VITALS
OXYGEN SATURATION: 98 % | DIASTOLIC BLOOD PRESSURE: 89 MMHG | HEART RATE: 93 BPM | SYSTOLIC BLOOD PRESSURE: 149 MMHG | RESPIRATION RATE: 18 BRPM | TEMPERATURE: 98 F

## 2023-05-20 DIAGNOSIS — T78.40XD ALLERGIC REACTION, SUBSEQUENT ENCOUNTER: ICD-10-CM

## 2023-05-20 DIAGNOSIS — H10.13 ALLERGIC CONJUNCTIVITIS OF BOTH EYES: Primary | ICD-10-CM

## 2023-05-20 DIAGNOSIS — R22.0 FACIAL SWELLING: ICD-10-CM

## 2023-05-20 LAB
ANION GAP SERPL CALCULATED.3IONS-SCNC: 8 MMOL/L (ref 4–13)
BASOPHILS # BLD AUTO: 0.08 THOUSANDS/ÂΜL (ref 0–0.1)
BASOPHILS NFR BLD AUTO: 1 % (ref 0–1)
BUN SERPL-MCNC: 8 MG/DL (ref 5–25)
CALCIUM SERPL-MCNC: 8.7 MG/DL (ref 8.4–10.2)
CHLORIDE SERPL-SCNC: 106 MMOL/L (ref 96–108)
CO2 SERPL-SCNC: 24 MMOL/L (ref 21–32)
CREAT SERPL-MCNC: 0.7 MG/DL (ref 0.6–1.3)
EOSINOPHIL # BLD AUTO: 0.05 THOUSAND/ÂΜL (ref 0–0.61)
EOSINOPHIL NFR BLD AUTO: 1 % (ref 0–6)
ERYTHROCYTE [DISTWIDTH] IN BLOOD BY AUTOMATED COUNT: 15.7 % (ref 11.6–15.1)
GFR SERPL CREATININE-BSD FRML MDRD: 107 ML/MIN/1.73SQ M
GLUCOSE SERPL-MCNC: 153 MG/DL (ref 65–140)
HCT VFR BLD AUTO: 45.6 % (ref 34.8–46.1)
HGB BLD-MCNC: 15.1 G/DL (ref 11.5–15.4)
IMM GRANULOCYTES # BLD AUTO: 0.07 THOUSAND/UL (ref 0–0.2)
IMM GRANULOCYTES NFR BLD AUTO: 1 % (ref 0–2)
LYMPHOCYTES # BLD AUTO: 3.7 THOUSANDS/ÂΜL (ref 0.6–4.47)
LYMPHOCYTES NFR BLD AUTO: 33 % (ref 14–44)
MCH RBC QN AUTO: 28.1 PG (ref 26.8–34.3)
MCHC RBC AUTO-ENTMCNC: 33.1 G/DL (ref 31.4–37.4)
MCV RBC AUTO: 85 FL (ref 82–98)
MONOCYTES # BLD AUTO: 0.42 THOUSAND/ÂΜL (ref 0.17–1.22)
MONOCYTES NFR BLD AUTO: 4 % (ref 4–12)
NEUTROPHILS # BLD AUTO: 6.77 THOUSANDS/ÂΜL (ref 1.85–7.62)
NEUTS SEG NFR BLD AUTO: 60 % (ref 43–75)
NRBC BLD AUTO-RTO: 0 /100 WBCS
PLATELET # BLD AUTO: 284 THOUSANDS/UL (ref 149–390)
PMV BLD AUTO: 8.7 FL (ref 8.9–12.7)
POTASSIUM SERPL-SCNC: 3.7 MMOL/L (ref 3.5–5.3)
RBC # BLD AUTO: 5.37 MILLION/UL (ref 3.81–5.12)
SODIUM SERPL-SCNC: 138 MMOL/L (ref 135–147)
WBC # BLD AUTO: 11.09 THOUSAND/UL (ref 4.31–10.16)

## 2023-05-20 RX ORDER — DIPHENHYDRAMINE HCL 25 MG
25 TABLET ORAL ONCE
Status: COMPLETED | OUTPATIENT
Start: 2023-05-20 | End: 2023-05-20

## 2023-05-20 RX ADMIN — DIPHENHYDRAMINE HYDROCHLORIDE 25 MG: 25 TABLET ORAL at 12:52

## 2023-05-20 RX ADMIN — IOHEXOL 85 ML: 350 INJECTION, SOLUTION INTRAVENOUS at 13:48

## 2023-05-20 NOTE — ED PROVIDER NOTES
"History  Chief Complaint   Patient presents with   • Facial Swelling     Pt reports being seen multiple times for eye pain, swelling, redness  States she was told it was allergies and prescribed medication that did not help  Most recently saw her PCP who prescribed steroids and penicillin without relief  States swelling is worse in the morning, but burning sensation and redness last throughout the day  Past Medical History: Asthma, Thyroid Cancer, Eczema, Headache- migraine, Hirsutism, Hyperthyroidism, Multiple sclerosis, Postoperative hypothyroidism,   Past Surgical History:  SECTION, Robotic laparoscopic hysterectomy bilateral salpingectomy, THYROIDECTOMY     Pt presents to ED c/o 6 day h/o persistent B/L periorbital irritation, redness, swelling left>right, slight itching, no other allergy sx, no fever, rash, nasal congestion; states typically does not get allergies  Pt states swelling is worse in am, gets better with ice and throughout the day  This is patient's 4th visit for this complaints  5/15: Given: Allegra PO and patanol eye drops, but only used for about 2 days; then seen here  added Ketotifen which she used for 1 day; pt stats \"drops weren't working immed so she stopped them; then seen by PCP : dx with bug bite given IM steroids, dc on prednisone/Augmentin which she's been taking without improvement  Pt currently not using any eye drops  Prior to Admission Medications   Prescriptions Last Dose Informant Patient Reported? Taking?    Allergy Relief 180 MG tablet Not Taking  Yes No   Patient not taking: Reported on 2023   Cholecalciferol (Vitamin D3) 125 MCG (5000 UT) CAPS   No No   Sig: Take 1 capsule (5,000 Units total) by mouth daily   amoxicillin-clavulanate (Augmentin) 875-125 mg per tablet   No Yes   Sig: Take 1 tablet by mouth every 12 (twelve) hours for 10 days   ascorbic acid (VITAMIN C) 1000 MG tablet   No Yes   Sig: Take 1 tablet (1,000 mg total) by mouth " daily   ketotifen (ZADITOR) 0 025 % ophthalmic solution Not Taking  No No   Sig: Administer 1 drop to both eyes 2 (two) times a day   Patient not taking: Reported on 2023   levothyroxine 112 mcg tablet   Yes Yes   Sig: TAKE 2 TABLETS (224 MCG TOTAL) BY MOUTH EVERY MORNING  BRAND SYNTHROID   DOSE ADJUSTED 2022   levothyroxine 200 mcg tablet Not Taking  Yes No   Sig: Take 200 mcg by mouth daily   Patient not taking: Reported on 2023   natalizumab (Tysabri) 300 mg/15 mL   Yes No   Sig: Infuse 300 mg into a venous catheter   nicotine (NICODERM CQ) 14 mg/24hr TD 24 hr patch   No Yes   Sig: Place 1 patch on the skin every 24 hours   nicotine (NICODERM CQ) 21 mg/24 hr TD 24 hr patch Not Taking  No No   Sig: Place 1 patch on the skin over 24 hours every 24 hours   Patient not taking: Reported on 2023   olopatadine HCl (PATADAY) 0 2 % opth drops Not Taking  Yes No   Sig: Apply 1 drop to eye daily   Patient not taking: Reported on 2023   predniSONE 20 mg tablet   No Yes   Sig: Take 1 tablet (20 mg total) by mouth daily for 5 days   spironolactone (ALDACTONE) 25 mg tablet Not Taking  No No   Sig: Take 1 tablet (25 mg total) by mouth daily   Patient not taking: Reported on 2023   vitamin B-12 (VITAMIN B-12) 1,000 mcg tablet   No No   Sig: Take 2 tablets (2,000 mcg total) by mouth daily   Patient not taking: Reported on 3/16/2023      Facility-Administered Medications: None       Past Medical History:   Diagnosis Date   • Asthma     no inhaler use    • Back problem    • Cancer [facility])     THYROID CANCER   • Disease of thyroid gland    • Eczema    • Ex-smoker 10/20/2021   • Headache, migraine    • Hirsutism 2020   • Hx of thyroid cancer 10/20/2021    Papillary =dx    • Hyperthyroidism    • Multiple sclerosis ([facility] [address] )    • Postoperative hypothyroidism 2023   • Thyroid cancer ([facility] [address] )    • Vitamin D deficiency 2020       Past Surgical History:   Procedure Laterality Date   •  SECTION      2001,  2004 and 3/23/2012    • COLONOSCOPY  2019    dr saunders   • EGD AND COLONOSCOPY  10/30/2019    antral gastritis=dr saunders   • HYSTERECTOMY  2019    Robotic laparoscopic hysterectomy bilateral salpingectomy   • THYROIDECTOMY  2016       Family History   Problem Relation Age of Onset   • Hypertension Maternal Grandmother    • Colon cancer Maternal Grandfather    • No Known Problems Mother    • Hepatitis Father    • No Known Problems Maternal Aunt    • No Known Problems Maternal Aunt    • No Known Problems Maternal Aunt      I have reviewed and agree with the history as documented  E-Cigarette/Vaping   • E-Cigarette Use Former User      E-Cigarette/Vaping Substances   • THC Yes    • CBD No    • Flavoring Yes      Social History     Tobacco Use   • Smoking status: Former     Packs/day: 0 00     Years: 15 00     Pack years: 0 00     Types: Cigarettes     Quit date: 10/12/2021     Years since quittin 6   • Smokeless tobacco: Never   • Tobacco comments:     Current some day smoker, 1 PPW - As per [place] [employer]   • Vaping Use: Former   • Substances: THC, Flavoring   Substance Use Topics   • Alcohol use: Yes     Comment: Alcohol intake:   Occasional  - As per [place]    • Drug use: Yes     Types: Marijuana       Review of Systems   Constitutional: Negative for chills and fever  HENT: Positive for facial swelling  Negative for congestion, ear pain, rhinorrhea, sneezing, sore throat and trouble swallowing  Eyes: Positive for pain, redness and itching  Negative for photophobia and visual disturbance  Respiratory: Negative for shortness of breath  Cardiovascular: Negative for chest pain  Gastrointestinal: Negative for vomiting  Skin: Negative for rash  Neurological: Negative for weakness and headaches  All other systems reviewed and are negative  Physical Exam  Physical Exam  Vitals and nursing note reviewed     Constitutional:       General: She is in acute distress (mild)  Appearance: She is well-developed  HENT:      Head: Normocephalic and atraumatic  Right Ear: External ear normal       Left Ear: External ear normal       Nose: Nose normal       Mouth/Throat:      Mouth: Mucous membranes are moist       Pharynx: Oropharynx is clear  Eyes:      General: Vision grossly intact  Right eye: No discharge  Left eye: No discharge  Conjunctiva/sclera:      Right eye: Right conjunctiva is injected  No exudate or hemorrhage  Left eye: Left conjunctiva is injected  Chemosis (left lower lid) present  No exudate or hemorrhage  Comments: Mild diffuse B/L periorbital swelling left>right   Cardiovascular:      Rate and Rhythm: Normal rate  Pulmonary:      Effort: Pulmonary effort is normal  No respiratory distress  Musculoskeletal:         General: Normal range of motion  Cervical back: Normal range of motion  Skin:     General: Skin is warm and dry  Capillary Refill: Capillary refill takes less than 2 seconds  Findings: No erythema, lesion or rash  Neurological:      General: No focal deficit present  Mental Status: She is alert  Cranial Nerves: No cranial nerve deficit  Motor: No weakness     Psychiatric:         Behavior: Behavior normal                  Vital Signs  ED Triage Vitals   Temperature Pulse Respirations Blood Pressure SpO2   05/20/23 1216 05/20/23 1213 05/20/23 1213 05/20/23 1213 05/20/23 1213   98 °F (36 7 °C) 93 18 149/89 98 %      Temp Source Heart Rate Source Patient Position - Orthostatic VS BP Location FiO2 (%)   05/20/23 1216 05/20/23 1213 05/20/23 1213 05/20/23 1213 --   Oral Monitor Sitting Left arm       Pain Score       --                  Vitals:    05/20/23 1213   BP: 149/89   Pulse: 93   Patient Position - Orthostatic VS: Sitting         Visual Acuity  Visual Acuity    Flowsheet Row Most Recent Value   Visual acuity R eye is 20/50   Visual acuity Left eye is 20/70 Visual acuity in both eyes is 20/40   Visual acuity uncorrected 20/40   Wearing corrective eyewear/lenses?  No   L Pupil Size (mm) 3   R Pupil Size (mm) 3   L Pupil Shape Round   R Pupil Shape Round          ED Medications  Medications   diphenhydrAMINE (BENADRYL) tablet 25 mg (25 mg Oral Given 5/20/23 1252)   iohexol (OMNIPAQUE) 350 MG/ML injection (SINGLE-DOSE) 85 mL (85 mL Intravenous Given 5/20/23 1348)       Diagnostic Studies  Results Reviewed     Procedure Component Value Units Date/Time    Basic metabolic panel [[id number]]  (Abnormal) Collected: 05/20/23 1257    Lab Status: Final result Specimen: Blood from Arm, Left Updated: 05/20/23 1318     Sodium 138 mmol/L      Potassium 3 7 mmol/L      Chloride 106 mmol/L      CO2 24 mmol/L      ANION GAP 8 mmol/L      BUN 8 mg/dL      Creatinine 0 70 mg/dL      Glucose 153 mg/dL      Calcium 8 7 mg/dL      eGFR 107 ml/min/1 73sq m     Narrative:      [place] guidelines for Chronic Kidney Disease (CKD):   •  Stage 1 with normal or high GFR (GFR > 90 mL/min/1 73 square meters)  •  Stage 2 Mild CKD (GFR = 60-89 mL/min/1 73 square meters)  •  Stage 3A Moderate CKD (GFR = 45-59 mL/min/1 73 square meters)  •  Stage 3B Moderate CKD (GFR = 30-44 mL/min/1 73 square meters)  •  Stage 4 Severe CKD (GFR = 15-29 mL/min/1 73 square meters)  •  Stage 5 End Stage CKD (GFR <15 mL/min/1 73 square meters)  Note: GFR calculation is accurate only with a steady state creatinine    CBC and differential [[id number]]  (Abnormal) Collected: 05/20/23 1257    Lab Status: Final result Specimen: Blood from Arm, Left Updated: 05/20/23 1303     WBC 11 09 Thousand/uL      RBC 5 37 Million/uL      Hemoglobin 15 1 g/dL      Hematocrit 45 6 %      MCV 85 fL      MCH 28 1 pg      MCHC 33 1 g/dL      RDW 15 7 %      MPV 8 7 fL      Platelets 043 Thousands/uL      nRBC 0 /100 WBCs      Neutrophils Relative 60 %      Immat GRANS % 1 %      Lymphocytes Relative 33 %      Monocytes "Relative 4 %      Eosinophils Relative 1 %      Basophils Relative 1 %      Neutrophils Absolute 6 77 Thousands/µL      Immature Grans Absolute 0 07 Thousand/uL      Lymphocytes Absolute 3 70 Thousands/µL      Monocytes Absolute 0 42 Thousand/µL      Eosinophils Absolute 0 05 Thousand/µL      Basophils Absolute 0 08 Thousands/µL                  CT orbits/temporal bones/skull base w contrast   Final Result by Jamin Kelley DO (05/20 1404)      Mild preseptal soft tissue swelling on the left primarily involving the lower lid extending into the adjacent subcutaneous fat with no abscess or discrete soft tissue mass  Findings suggestive of mild superficial cellulitis  No other orbital pathology identified  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  Workstation performed: [id number]                    Procedures  Procedures         ED Course  ED Course as of 05/20/23 1430   Sat May 20, 2023   1308 Cbc unremarkable   1326 Bmp unmarkable for complaints, able to proceed with CT scan   1419 Ct report reviewed  Skin is not red, warmth to touch, still appears more allergic in natures, to continue with treatment plan                                             Medical Decision Making  Pt here with persistent periorbital swelling/redness/conjunctivits, not responding to meds, 4th visit to provider  Consider: allergic conjunctivitis, allergic response, cellulitis, insect bite, seasonal allergies, among other considerations    Amount and/or Complexity of Data Reviewed  External Data Reviewed: notes  Labs: ordered  Decision-making details documented in ED Course  Radiology: ordered  Decision-making details documented in ED Course       Details: Ct shows cellulitis, but doubt infectious, clinically, still suspect allergic in nature  Discussion of management or test interpretation with external provider(s): TT ophtho on call Dr Iván Lomeli, \"I " "would suggest \"preservative free artificial tears every two hours while awake\"  call the office on monday at 8 and we'll schedule her; cool compresses; may be helpful to give her a wash out period from the multiple meds she's been using  especially since none is particularly helpful, no concerns with MS history  Risk  OTC drugs  Prescription drug management  Disposition  Final diagnoses: Allergic conjunctivitis of both eyes   Facial swelling     Time reflects when diagnosis was documented in both MDM as applicable and the Disposition within this note     Time User Action Codes Description Comment    5/20/2023 12:49 PM Melodye Inoue Add [H10 13] Allergic conjunctivitis of both eyes     5/20/2023  2:22 PM Melodye Inoue Add [R22 0] Facial swelling       ED Disposition     ED Disposition   Discharge    Condition   Stable    Date/Time   Sat May 20, 2023  2:22 PM    Comment   Matthew Schafeer discharge to home/self care  Follow-up Information     Follow up With Specialties Details Why Contact Info    Anahi Webster MD Ophthalmology  Call office Monday at 0800 to be seen that day [address] [place] [address]  [phone]            Patient's Medications   Discharge Prescriptions    No medications on file       No discharge procedures on file      PDMP Review     None          ED Provider  Electronically Signed by           Barbara Mg PA-C  05/20/23 1026    "

## 2023-05-20 NOTE — DISCHARGE INSTRUCTIONS
"The eye doctor recommended \"over the counter preservative free artificial tears every 2 hours while awake; cool compresses\"  You should also be taking an oral antihistamine: Claritin, Zyrtec, Allegra   You can continue the oral steroids and antibiotics until done      You need to follow-up with eye doctor, call office for appointment on Monday    "

## 2023-05-22 ENCOUNTER — VBI (OUTPATIENT)
Dept: ADMINISTRATIVE | Facility: OTHER | Age: 43
End: 2023-05-22

## 2023-05-22 NOTE — TELEPHONE ENCOUNTER
Ila Maria Isabel    ED Visit Information     Ed visit date: 05/20/2023  Diagnosis Description: Allergic conjunctivitis of both eyes; Facial swelling  In Network? Yes [address]  Discharge status: Home  Discharged with meds ? No  Number of ED visits to date: 2  ED Severity:4     Outreach Information    Outreach successful: Yes 1  Date letter mailed:N/A  Date Finalized:05/22/2023    Care Coordination    Follow up appointment with pcp: no no  Transportation issues ? No    [facility] [employer] type: 3 Day Outreach  Emergent necessity warranted by diagnosis: Yes  [facility] PCP: Yes  Transportation: Self Transport  Practice Contacted Patient ?: No  Pt had ED follow up with pcp/staff ?: No    S/W patient who states she is still the same, but swelling went down  She will be calling an ophthalmologist for follow up today

## 2023-05-24 LAB
A ALTERNATA IGE QN: 1.07 KUA/I
A FUMIGATUS IGE QN: <0.1 KUA/I
ALMOND IGE QN: <0.1 KUA/I
BERMUDA GRASS IGE QN: <0.1 KUA/I
BOXELDER IGE QN: 0.61 KUA/I
C HERBARUM IGE QN: <0.1 KUA/I
CASHEW NUT IGE QN: <0.1 KUA/I
CAT DANDER IGE QN: <0.1 KUA/I
CMN PIGWEED IGE QN: <0.1 KUA/I
CODFISH IGE QN: <0.1 KUA/I
COMMON RAGWEED IGE QN: <0.1 KUA/I
COTTONWOOD IGE QN: <0.1 KUA/I
D FARINAE IGE QN: <0.1 KUA/I
D PTERONYSS IGE QN: <0.1 KUA/I
DOG DANDER IGE QN: <0.1 KUA/I
EGG WHITE IGE QN: <0.1 KUA/I
GLUTEN IGE QN: <0.1 KUA/I
HAZELNUT IGE QN: <0.1 KUA/L
LONDON PLANE IGE QN: <0.1 KUA/I
MILK IGE QN: <0.1 KUA/I
MOUSE URINE PROT IGE QN: <0.1 KUA/I
MT JUNIPER IGE QN: <0.1 KUA/I
MUGWORT IGE QN: <0.1 KUA/I
P NOTATUM IGE QN: <0.1 KUA/I
PEANUT IGE QN: <0.1 KUA/I
ROACH IGE QN: 0.55 KUA/I
SALMON IGE QN: <0.1 KUA/I
SCALLOP IGE QN: <0.1 KUA/L
SESAME SEED IGE QN: <0.1 KUA/I
SHEEP SORREL IGE QN: <0.1 KUA/I
SHRIMP IGE QN: 1.26 KUA/L
SILVER BIRCH IGE QN: <0.1 KUA/I
SOYBEAN IGE QN: <0.1 KUA/I
TIMOTHY IGE QN: 0.3 KUA/I
TOTAL IGE SMQN RAST: 58.2 KU/L (ref 0–113)
TOTAL IGE SMQN RAST: 64.6 KU/L (ref 0–113)
TUNA IGE QN: <0.1 KUA/I
WALNUT IGE QN: <0.1 KUA/I
WALNUT IGE QN: <0.1 KUA/I
WHEAT IGE QN: <0.1 KUA/I
WHITE ASH IGE QN: <0.1 KUA/I
WHITE ELM IGE QN: <0.1 KUA/I
WHITE MULBERRY IGE QN: <0.1 KUA/I
WHITE OAK IGE QN: <0.1 KUA/I

## 2023-06-07 ENCOUNTER — APPOINTMENT (OUTPATIENT)
Dept: LAB | Facility: HOSPITAL | Age: 43
End: 2023-06-07
Payer: MEDICARE

## 2023-06-07 DIAGNOSIS — E89.0 POSTSURGICAL HYPOTHYROIDISM: ICD-10-CM

## 2023-06-07 DIAGNOSIS — C73 MALIGNANT NEOPLASM OF THYROID GLAND (HCC): ICD-10-CM

## 2023-06-07 LAB
ALBUMIN SERPL BCP-MCNC: 4.8 G/DL (ref 3.5–5)
ALP SERPL-CCNC: 68 U/L (ref 34–104)
ALT SERPL W P-5'-P-CCNC: 23 U/L (ref 7–52)
ANION GAP SERPL CALCULATED.3IONS-SCNC: 8 MMOL/L (ref 4–13)
AST SERPL W P-5'-P-CCNC: 16 U/L (ref 13–39)
BILIRUB SERPL-MCNC: 1.32 MG/DL (ref 0.2–1)
BUN SERPL-MCNC: 7 MG/DL (ref 5–25)
CALCIUM SERPL-MCNC: 9.6 MG/DL (ref 8.4–10.2)
CHLORIDE SERPL-SCNC: 103 MMOL/L (ref 96–108)
CO2 SERPL-SCNC: 26 MMOL/L (ref 21–32)
CREAT SERPL-MCNC: 0.73 MG/DL (ref 0.6–1.3)
GFR SERPL CREATININE-BSD FRML MDRD: 101 ML/MIN/1.73SQ M
GLUCOSE P FAST SERPL-MCNC: 89 MG/DL (ref 65–99)
POTASSIUM SERPL-SCNC: 3.7 MMOL/L (ref 3.5–5.3)
PROT SERPL-MCNC: 8.1 G/DL (ref 6.4–8.4)
SODIUM SERPL-SCNC: 137 MMOL/L (ref 135–147)
T3 SERPL-MCNC: 0.7 NG/ML
T4 FREE SERPL-MCNC: 0.55 NG/DL (ref 0.61–1.12)
TSH SERPL DL<=0.05 MIU/L-ACNC: 57.74 UIU/ML (ref 0.45–4.5)

## 2023-06-07 PROCEDURE — 86800 THYROGLOBULIN ANTIBODY: CPT

## 2023-06-07 PROCEDURE — 84480 ASSAY TRIIODOTHYRONINE (T3): CPT

## 2023-06-07 PROCEDURE — 84443 ASSAY THYROID STIM HORMONE: CPT

## 2023-06-07 PROCEDURE — 36415 COLL VENOUS BLD VENIPUNCTURE: CPT

## 2023-06-07 PROCEDURE — 84432 ASSAY OF THYROGLOBULIN: CPT

## 2023-06-07 PROCEDURE — 84439 ASSAY OF FREE THYROXINE: CPT

## 2023-06-07 PROCEDURE — 80053 COMPREHEN METABOLIC PANEL: CPT

## 2023-06-13 LAB
THYROGLOB AB SERPL-ACNC: 20.1 IU/ML (ref 0–0.9)
THYROGLOB SERPL-MCNC: <2 NG/ML

## 2023-06-23 NOTE — TELEPHONE ENCOUNTER
Fax received from pharmacy requesting refill on patients nicotine 21MG/24HR patch  Patient indicated as no longer taking  Medication is not being sent for refill

## 2023-08-07 NOTE — TELEPHONE ENCOUNTER
Patient had labs done the day of her appointment on Feb 6, results in [address]  Results had been reviewed with patient in 2/6/19 encounter  Patient had appointment with Heme onc on 2/22/19 and, per office note, is starting Venofer infusions on 3/6/19  Spoke with patient regarding Aubagio, patient states, "they are still waiting for authorization "  They had not contacted the office notifying of need for PA  Will submit today  Patient has been off of Tecfidera since before her appointment with Jovani Tineo on 2/6/19  Jovani Tineo- do you want her to get another set of labs? Please advise  Bactrim Pregnancy And Lactation Text: This medication is Pregnancy Category D and is known to cause fetal risk.  It is also excreted in breast milk.